# Patient Record
Sex: FEMALE | Race: BLACK OR AFRICAN AMERICAN | NOT HISPANIC OR LATINO | URBAN - METROPOLITAN AREA
[De-identification: names, ages, dates, MRNs, and addresses within clinical notes are randomized per-mention and may not be internally consistent; named-entity substitution may affect disease eponyms.]

---

## 2017-03-01 ENCOUNTER — OUTPATIENT (OUTPATIENT)
Dept: OUTPATIENT SERVICES | Facility: HOSPITAL | Age: 57
LOS: 1 days | End: 2017-03-01

## 2017-03-01 DIAGNOSIS — Z96.60 PRESENCE OF UNSPECIFIED ORTHOPEDIC JOINT IMPLANT: Chronic | ICD-10-CM

## 2017-03-01 DIAGNOSIS — Z98.89 OTHER SPECIFIED POSTPROCEDURAL STATES: Chronic | ICD-10-CM

## 2017-03-01 DIAGNOSIS — T84.093A OTHER MECHANICAL COMPLICATION OF INTERNAL LEFT KNEE PROSTHESIS, INITIAL ENCOUNTER: Chronic | ICD-10-CM

## 2017-03-01 DIAGNOSIS — M21.611 BUNION OF RIGHT FOOT: Chronic | ICD-10-CM

## 2017-03-01 DIAGNOSIS — Z90.710 ACQUIRED ABSENCE OF BOTH CERVIX AND UTERUS: Chronic | ICD-10-CM

## 2017-03-01 DIAGNOSIS — Z98.890 OTHER SPECIFIED POSTPROCEDURAL STATES: Chronic | ICD-10-CM

## 2017-03-01 DIAGNOSIS — Z90.711 ACQUIRED ABSENCE OF UTERUS WITH REMAINING CERVICAL STUMP: Chronic | ICD-10-CM

## 2017-03-01 DIAGNOSIS — Z96.642 PRESENCE OF LEFT ARTIFICIAL HIP JOINT: Chronic | ICD-10-CM

## 2017-03-01 DIAGNOSIS — Z98.51 TUBAL LIGATION STATUS: Chronic | ICD-10-CM

## 2017-03-01 DIAGNOSIS — Z98.84 BARIATRIC SURGERY STATUS: Chronic | ICD-10-CM

## 2017-03-13 DIAGNOSIS — R69 ILLNESS, UNSPECIFIED: ICD-10-CM

## 2018-07-17 ENCOUNTER — RESULT REVIEW (OUTPATIENT)
Age: 58
End: 2018-07-17

## 2021-04-06 ENCOUNTER — RESULT REVIEW (OUTPATIENT)
Age: 61
End: 2021-04-06

## 2021-08-06 ENCOUNTER — APPOINTMENT (OUTPATIENT)
Dept: ORTHOPEDIC SURGERY | Facility: CLINIC | Age: 61
End: 2021-08-06
Payer: MEDICARE

## 2021-08-06 VITALS
OXYGEN SATURATION: 98 % | BODY MASS INDEX: 27.14 KG/M2 | HEART RATE: 70 BPM | WEIGHT: 159 LBS | DIASTOLIC BLOOD PRESSURE: 89 MMHG | SYSTOLIC BLOOD PRESSURE: 151 MMHG | HEIGHT: 64 IN

## 2021-08-06 VITALS — TEMPERATURE: 97.7 F

## 2021-08-06 PROCEDURE — 72100 X-RAY EXAM L-S SPINE 2/3 VWS: CPT

## 2021-08-06 PROCEDURE — 99203 OFFICE O/P NEW LOW 30 MIN: CPT

## 2021-08-11 NOTE — DISCUSSION/SUMMARY
[de-identified] : Given her worsening symptoms a lumbar spine MRI to be obtained.  Follow-up afterwards.

## 2021-08-11 NOTE — PHYSICAL EXAM
[de-identified] : Examination of the lumbar spine reveals no midline tenderness palpation, step-offs, or skin lesions. Decreased range of motion with respect to flexion, extension, lateral bending, and rotation. No tenderness to palpation of the sciatic notch. No tenderness palpation of the bilateral greater trochanters. No pain with passive internal/external rotation of the hips. No instability of bilateral lower extremities.  Negative TYRELL. Negative straight leg raise bilaterally. No bowstring. Negative femoral stretch. 5 out of 5 iliopsoas, hip abductors, hips adductors, quadriceps, hamstrings, gastrocsoleus, tibialis anterior, extensor hallucis longus, peroneals. Grossly intact sensation to light touch bilateral lower extremities. 1+ patellar and Achilles reflexes. Downgoing Babinski. No clonus. Intact proprioception. Palpable pulses. No skin lesion and no edema on the right and left lower extremities. [de-identified] : AP lateral lumbar x-rays with some L4-5 spondylolisthesis

## 2021-08-11 NOTE — HISTORY OF PRESENT ILLNESS
[de-identified] : Ms. ALCIDES RAMÍREZ  is a 61 year old female who presents with a chronic history of low back and bilateral leg pain (right > left).  Her symptoms have gotten worse over time.     Normal bowel and bladder control.   Denies any recent fevers, chills, sweats, weight loss, or infection. She has tried tramadol without relief.  Nsaids will help but she has a bleeding ulcer so she is not supposed to take them. \par \par The patients past medical history, past surgical history, medications, allergies, and social history were reviewed by me today with the patient and documented accordingly.  In addition, the patient's family history, which is noncontributory to their visit, was also reviewed.\par

## 2021-09-03 ENCOUNTER — APPOINTMENT (OUTPATIENT)
Dept: ORTHOPEDIC SURGERY | Facility: CLINIC | Age: 61
End: 2021-09-03
Payer: MEDICARE

## 2021-09-03 PROCEDURE — 99214 OFFICE O/P EST MOD 30 MIN: CPT

## 2021-09-03 NOTE — PHYSICAL EXAM
[Antalgic] : antalgic [Cane] : ambulates with cane [de-identified] : Examination of the lumbar spine reveals no midline tenderness palpation, step-offs, or skin lesions. Decreased range of motion with respect to flexion, extension, lateral bending, and rotation. No tenderness to palpation of the sciatic notch. No tenderness palpation of the bilateral greater trochanters. No pain with passive internal/external rotation of the hips. No instability of bilateral lower extremities.  Negative TYRELL. Negative straight leg raise bilaterally. No bowstring. Negative femoral stretch. 5 out of 5 iliopsoas, hip abductors, hips adductors, quadriceps, hamstrings, gastrocsoleus, tibialis anterior, extensor hallucis longus, peroneals. Grossly intact sensation to light touch bilateral lower extremities. 1+ patellar and Achilles reflexes. Downgoing Babinski. No clonus. Intact proprioception. Palpable pulses. No skin lesion and no edema on the right and left lower extremities. [de-identified] : AP lateral lumbar x-rays with some L4-5 spondylolisthesis\par \par Lumbar spine MRI does reveal moderate to severe stenosis from L2-S1. L4-5 and trace L5-S1 spondylolisthesis

## 2021-09-03 NOTE — HISTORY OF PRESENT ILLNESS
[de-identified] : Ms. ALCIDES RAMÍREZ  is a 61 year old female who presents to the office for a follow-up visit.  She is here to review her MRI results.\par

## 2021-09-03 NOTE — DISCUSSION/SUMMARY
[de-identified] : We discussed further treatment options. She has had extensive nonsurgical treatment with medications, physical therapy, and epidural injections. We discussed the nature purpose of an L2-S1 laminectomy with fusion at L4-5 and possibly at L5-S1. Risks of surgery were discussed including but not limited to bleeding, infection,  hardware related complications, graft migration, pseudoarthrosis, damage to nerves and vessels, continued or worsening pain and weakness, dural injury, CSF leak, need for further procedures, PE/DVT, GI, , pulmonary, and cardiac complications,  anesthetic risks, and death. The patient understands the risks and benefits and alternatives. They would like to proceed with surgery. They will be scheduled accordingly. They will follow up for a preoperative appointment.

## 2021-09-09 ENCOUNTER — OUTPATIENT (OUTPATIENT)
Dept: OUTPATIENT SERVICES | Facility: HOSPITAL | Age: 61
LOS: 1 days | End: 2021-09-09
Payer: MEDICARE

## 2021-09-09 VITALS
WEIGHT: 149.91 LBS | HEART RATE: 76 BPM | TEMPERATURE: 97 F | OXYGEN SATURATION: 98 % | HEIGHT: 65 IN | RESPIRATION RATE: 20 BRPM | SYSTOLIC BLOOD PRESSURE: 130 MMHG | DIASTOLIC BLOOD PRESSURE: 90 MMHG

## 2021-09-09 DIAGNOSIS — Z98.89 OTHER SPECIFIED POSTPROCEDURAL STATES: Chronic | ICD-10-CM

## 2021-09-09 DIAGNOSIS — M48.07 SPINAL STENOSIS, LUMBOSACRAL REGION: ICD-10-CM

## 2021-09-09 DIAGNOSIS — Z98.84 BARIATRIC SURGERY STATUS: Chronic | ICD-10-CM

## 2021-09-09 DIAGNOSIS — Z98.51 TUBAL LIGATION STATUS: Chronic | ICD-10-CM

## 2021-09-09 DIAGNOSIS — Z96.642 PRESENCE OF LEFT ARTIFICIAL HIP JOINT: Chronic | ICD-10-CM

## 2021-09-09 DIAGNOSIS — Z90.710 ACQUIRED ABSENCE OF BOTH CERVIX AND UTERUS: Chronic | ICD-10-CM

## 2021-09-09 DIAGNOSIS — Z98.890 OTHER SPECIFIED POSTPROCEDURAL STATES: Chronic | ICD-10-CM

## 2021-09-09 DIAGNOSIS — Z90.711 ACQUIRED ABSENCE OF UTERUS WITH REMAINING CERVICAL STUMP: Chronic | ICD-10-CM

## 2021-09-09 DIAGNOSIS — Z96.60 PRESENCE OF UNSPECIFIED ORTHOPEDIC JOINT IMPLANT: Chronic | ICD-10-CM

## 2021-09-09 DIAGNOSIS — M21.611 BUNION OF RIGHT FOOT: Chronic | ICD-10-CM

## 2021-09-09 DIAGNOSIS — T84.093A OTHER MECHANICAL COMPLICATION OF INTERNAL LEFT KNEE PROSTHESIS, INITIAL ENCOUNTER: Chronic | ICD-10-CM

## 2021-09-09 DIAGNOSIS — M48.00 SPINAL STENOSIS, SITE UNSPECIFIED: ICD-10-CM

## 2021-09-09 LAB
ANION GAP SERPL CALC-SCNC: 13 MMOL/L — SIGNIFICANT CHANGE UP (ref 7–14)
BLD GP AB SCN SERPL QL: NEGATIVE — SIGNIFICANT CHANGE UP
BUN SERPL-MCNC: 20 MG/DL — SIGNIFICANT CHANGE UP (ref 7–23)
CALCIUM SERPL-MCNC: 10.1 MG/DL — SIGNIFICANT CHANGE UP (ref 8.4–10.5)
CHLORIDE SERPL-SCNC: 103 MMOL/L — SIGNIFICANT CHANGE UP (ref 98–107)
CO2 SERPL-SCNC: 26 MMOL/L — SIGNIFICANT CHANGE UP (ref 22–31)
CREAT SERPL-MCNC: 0.78 MG/DL — SIGNIFICANT CHANGE UP (ref 0.5–1.3)
GLUCOSE SERPL-MCNC: 76 MG/DL — SIGNIFICANT CHANGE UP (ref 70–99)
HCT VFR BLD CALC: 42.4 % — SIGNIFICANT CHANGE UP (ref 34.5–45)
HGB BLD-MCNC: 14 G/DL — SIGNIFICANT CHANGE UP (ref 11.5–15.5)
MCHC RBC-ENTMCNC: 30.2 PG — SIGNIFICANT CHANGE UP (ref 27–34)
MCHC RBC-ENTMCNC: 33 GM/DL — SIGNIFICANT CHANGE UP (ref 32–36)
MCV RBC AUTO: 91.4 FL — SIGNIFICANT CHANGE UP (ref 80–100)
NRBC # BLD: 0 /100 WBCS — SIGNIFICANT CHANGE UP
NRBC # FLD: 0 K/UL — SIGNIFICANT CHANGE UP
PLATELET # BLD AUTO: 237 K/UL — SIGNIFICANT CHANGE UP (ref 150–400)
POTASSIUM SERPL-MCNC: 4.2 MMOL/L — SIGNIFICANT CHANGE UP (ref 3.5–5.3)
POTASSIUM SERPL-SCNC: 4.2 MMOL/L — SIGNIFICANT CHANGE UP (ref 3.5–5.3)
RBC # BLD: 4.64 M/UL — SIGNIFICANT CHANGE UP (ref 3.8–5.2)
RBC # FLD: 14.4 % — SIGNIFICANT CHANGE UP (ref 10.3–14.5)
RH IG SCN BLD-IMP: POSITIVE — SIGNIFICANT CHANGE UP
SODIUM SERPL-SCNC: 142 MMOL/L — SIGNIFICANT CHANGE UP (ref 135–145)
WBC # BLD: 5.9 K/UL — SIGNIFICANT CHANGE UP (ref 3.8–10.5)
WBC # FLD AUTO: 5.9 K/UL — SIGNIFICANT CHANGE UP (ref 3.8–10.5)

## 2021-09-09 PROCEDURE — 93010 ELECTROCARDIOGRAM REPORT: CPT

## 2021-09-09 RX ORDER — AMLODIPINE BESYLATE AND BENAZEPRIL HYDROCHLORIDE 10; 20 MG/1; MG/1
1 CAPSULE ORAL
Qty: 0 | Refills: 0 | DISCHARGE

## 2021-09-09 RX ORDER — SODIUM CHLORIDE 9 MG/ML
1000 INJECTION, SOLUTION INTRAVENOUS
Refills: 0 | Status: DISCONTINUED | OUTPATIENT
Start: 2021-09-21 | End: 2021-09-22

## 2021-09-09 NOTE — H&P PST ADULT - MUSCULOSKELETAL COMMENTS
spinal stenosis lumbosacral region/ radiculopathy lumbar region/ spondylolisthesis lumbar region DX: spinal stenosis lumbosacral region/ radiculopathy lumbar region/ spondylolisthesis lumbar region

## 2021-09-09 NOTE — H&P PST ADULT - NSANTHOSAYNRD_GEN_A_CORE
Denies sleep studies done in the past/No. JAMMIE screening performed.  STOP BANG Legend: 0-2 = LOW Risk; 3-4 = INTERMEDIATE Risk; 5-8 = HIGH Risk

## 2021-09-09 NOTE — H&P PST ADULT - NSICDXPASTMEDICALHX_GEN_ALL_CORE_FT
PAST MEDICAL HISTORY:  Anemia     Anxiety disorder     Arthritis     CVA (cerebral vascular accident) 2008 with left sided weakness -resolved    Depression     Depression     GERD (gastroesophageal reflux disease)     Hernia     HTN (hypertension)     Hypertension     Insomnia     Iron deficiency anemia     Osteoarthritis     Panic attacks     Seasonal allergies     Stroke 2 instances in 2008. Left sided weakness residual, left sided facial droopiness    TIA (transient ischemic attack) 4 instances 2012     PAST MEDICAL HISTORY:  Anemia     Anxiety disorder     Arthritis     Bipolar disorder     CVA (cerebral vascular accident) 2008 with left sided weakness -resolved    Depression     Depression     GERD (gastroesophageal reflux disease)     Hernia     HTN (hypertension)     Hypertension     Insomnia     Iron deficiency anemia     Osteoarthritis     Panic attacks     Seasonal allergies     Stroke 2 instances in 2008. Left sided weakness residual, left sided facial droopiness    TIA (transient ischemic attack) 4 instances 2012

## 2021-09-09 NOTE — H&P PST ADULT - MUSCULOSKELETAL
no joint swelling/no joint erythema/no joint warmth/no calf tenderness/decreased ROM/diminished strength details… detailed exam

## 2021-09-09 NOTE — H&P PST ADULT - HISTORY OF PRESENT ILLNESS
60 y/o F presents to PST w/ a preop dx of spinal stenosis lumbosacral region/ radiculopathy lumbar region/ spondylolisthesis lumbar region and to be evaluated for a scheduled L2-S1 laminectomy and L4-5 fusion. pt states back pain for about 6 years, worsening. undergo PT, po meds, cortisone shoots w/o relief. Pt re evaluated and recommended surgical intervention at this time w/ Dr Strauss.

## 2021-09-09 NOTE — H&P PST ADULT - NSICDXPASTSURGICALHX_GEN_ALL_CORE_FT
PAST SURGICAL HISTORY:  Bunion of great toe of right foot removal of bunion    Failed total left knee replacement 2015    H/O gastric bypass     History of abdominoplasty     History of hysterectomy partial    History of left hip replacement     S/P arthroscopic surgery of right knee     S/P arthroscopy of left knee     S/P endoscopy 3-24-14 removed mesh    S/P gastric bypass 2004    S/P hip replacement left 2011    S/P partial hysterectomy 2005    S/P tubal ligation 1995

## 2021-09-09 NOTE — H&P PST ADULT - PROBLEM SELECTOR PLAN 1
Preop instructions provided including npo status, Hibiclens wash for infection control and GI prophylaxis. Pt to c/w current meds, aware to stop any NSAIDS, OTC herbals or MVI on 9/14/21. Verbalized understanding. BW done, pending results. DVT prophylaxis as per primary team. MC pending, form provided. Aware to f/u on covid testing prior to sx as per pst protocol and will arrange w/ surgeons office. JAMMIE precautions/allergy notified to OR booking via fax. Preop instructions provided including npo status, Hibiclens wash for infection control and GI prophylaxis. Pt to c/w current meds, take amlodipine in am dos as ordered. Pt aware to stop any NSAIDS, OTC herbals or MVI on 9/14/21. Verbalized understanding. BW done, pending results. DVT prophylaxis as per primary team. MC pending, form provided. Aware to f/u on covid testing prior to sx as per pst protocol and will arrange w/ surgeons office. JAMMIE precautions/allergy notified to OR booking via fax.

## 2021-09-09 NOTE — H&P PST ADULT - ATTENDING COMMENTS
the patient is having intractable back and leg pain, 4/5 BLE with exception of 2/5 L IP- the nature of the planned surgery, other options available to the patient, the risks and possible complications of this surgery and the other options, including but not limited to death, paralysis, nerve damage, infection, bleeding, failure of the surgery to relieve  symptoms, failure of fusion, dislodgement of interbody graft, hardware failure/breakage/loosening, use of DBM and inherent risks pulmonary and/or cardiac complications, DVT, PE, UTI, spinal fluid leakage, possible need for further surgery in the future, adjacent segment deterioration, etc were discussed with the patient at length and the patient understands and wishes to proceed

## 2021-09-09 NOTE — H&P PST ADULT - ASSESSMENT
DX: spinal stenosis lumbosacral region/ radiculopathy lumbar region/ spondylolisthesis lumbar region and evaluated for a scheduled L2-S1 laminectomy and L4-5 fusion w/ Dr Strauss on 9/21/21.

## 2021-09-09 NOTE — H&P PST ADULT - NEGATIVE GENERAL GENITOURINARY SYMPTOMS
no hematuria/no renal colic/no flank pain L/no flank pain R/no urine discoloration/no bladder infections/no incontinence/no dysuria/no urinary hesitancy/no nocturia

## 2021-09-09 NOTE — H&P PST ADULT - NS PRO FEM REPRO HEALTH SCREEN
Repair of closed fracture of tibia  2002  S/P orthopedic surgery, follow-up exam  arthoscopy L knee - x 2 - 2012, 2013
mammogram

## 2021-09-09 NOTE — H&P PST ADULT - NEUROLOGICAL SYMPTOMS
uses a cane for support/ unsteady Gait wnl, no devices stated/weakness/paresthesias/difficulty walking

## 2021-09-09 NOTE — H&P PST ADULT - MS GEN HX ROS MEA POS PC
knees/ hips/ back pain w/ oa and current condition/arthritis/joint pain/stiffness/back pain/leg pain L/leg pain R

## 2021-09-09 NOTE — H&P PST ADULT - NSICDXFAMILYHX_GEN_ALL_CORE_FT
FAMILY HISTORY:  Father  Still living? Unknown  Family history of cancer, Age at diagnosis: Age Unknown    Mother  Still living? Unknown  Family history of diabetes mellitus, Age at diagnosis: Age Unknown  Family history of hypertension, Age at diagnosis: Age Unknown  Family history of stroke, Age at diagnosis: Age Unknown    Sibling  Still living? Unknown  Family history of diabetes mellitus, Age at diagnosis: Age Unknown

## 2021-09-09 NOTE — H&P PST ADULT - NEGATIVE GASTROINTESTINAL SYMPTOMS
no nausea/no vomiting/no constipation/no change in bowel habits/no flatulence/no abdominal pain/no melena/no hematochezia/no steatorrhea/no jaundice/no hiccoughs

## 2021-09-09 NOTE — H&P PST ADULT - HEALTH CARE MAINTENANCE
Colonoscopy: wnl, last test done 4 year ago   flu vaccine: 2020  Covid Vaccine: x2 Pfyzer   Denies current covid S&S or in the past, test neg when done. Pt denies history of travel outside the country and outside [place] and denies COVID19 positive contacts within the last 14 days.

## 2021-09-09 NOTE — H&P PST ADULT - NEGATIVE NEUROLOGICAL SYMPTOMS
no transient paralysis/no generalized seizures/no focal seizures/no syncope/no tremors/no vertigo/no loss of sensation/no headache/no loss of consciousness/no hemiparesis/no confusion/no facial palsy

## 2021-09-09 NOTE — H&P PST ADULT - BACK COMMENTS
DX: spinal stenosis lumbosacral region/ radiculopathy lumbar region/ spondylolisthesis lumbar region

## 2021-09-10 LAB
MRSA PCR RESULT.: SIGNIFICANT CHANGE UP
S AUREUS DNA NOSE QL NAA+PROBE: SIGNIFICANT CHANGE UP

## 2021-09-12 PROBLEM — F31.9 BIPOLAR DISORDER, UNSPECIFIED: Chronic | Status: ACTIVE | Noted: 2021-09-09

## 2021-09-17 DIAGNOSIS — Z01.818 ENCOUNTER FOR OTHER PREPROCEDURAL EXAMINATION: ICD-10-CM

## 2021-09-18 ENCOUNTER — APPOINTMENT (OUTPATIENT)
Dept: DISASTER EMERGENCY | Facility: CLINIC | Age: 61
End: 2021-09-18

## 2021-09-19 LAB — SARS-COV-2 N GENE NPH QL NAA+PROBE: NOT DETECTED

## 2021-09-20 ENCOUNTER — TRANSCRIPTION ENCOUNTER (OUTPATIENT)
Age: 61
End: 2021-09-20

## 2021-09-21 ENCOUNTER — RESULT REVIEW (OUTPATIENT)
Age: 61
End: 2021-09-21

## 2021-09-21 ENCOUNTER — INPATIENT (INPATIENT)
Facility: HOSPITAL | Age: 61
LOS: 8 days | Discharge: NURSING, REHAB OR LONG-TERM CARE | End: 2021-09-30
Attending: STUDENT IN AN ORGANIZED HEALTH CARE EDUCATION/TRAINING PROGRAM | Admitting: STUDENT IN AN ORGANIZED HEALTH CARE EDUCATION/TRAINING PROGRAM
Payer: MEDICARE

## 2021-09-21 ENCOUNTER — APPOINTMENT (OUTPATIENT)
Dept: ORTHOPEDIC SURGERY | Facility: HOSPITAL | Age: 61
End: 2021-09-21
Payer: MEDICARE

## 2021-09-21 VITALS
WEIGHT: 149.91 LBS | TEMPERATURE: 98 F | DIASTOLIC BLOOD PRESSURE: 76 MMHG | HEART RATE: 68 BPM | SYSTOLIC BLOOD PRESSURE: 123 MMHG | HEIGHT: 65 IN | RESPIRATION RATE: 14 BRPM | OXYGEN SATURATION: 96 %

## 2021-09-21 DIAGNOSIS — Z96.642 PRESENCE OF LEFT ARTIFICIAL HIP JOINT: Chronic | ICD-10-CM

## 2021-09-21 DIAGNOSIS — Z98.51 TUBAL LIGATION STATUS: Chronic | ICD-10-CM

## 2021-09-21 DIAGNOSIS — M48.07 SPINAL STENOSIS, LUMBOSACRAL REGION: ICD-10-CM

## 2021-09-21 DIAGNOSIS — Z96.60 PRESENCE OF UNSPECIFIED ORTHOPEDIC JOINT IMPLANT: Chronic | ICD-10-CM

## 2021-09-21 DIAGNOSIS — Z98.89 OTHER SPECIFIED POSTPROCEDURAL STATES: Chronic | ICD-10-CM

## 2021-09-21 DIAGNOSIS — T84.093A OTHER MECHANICAL COMPLICATION OF INTERNAL LEFT KNEE PROSTHESIS, INITIAL ENCOUNTER: Chronic | ICD-10-CM

## 2021-09-21 DIAGNOSIS — Z98.84 BARIATRIC SURGERY STATUS: Chronic | ICD-10-CM

## 2021-09-21 DIAGNOSIS — Z90.710 ACQUIRED ABSENCE OF BOTH CERVIX AND UTERUS: Chronic | ICD-10-CM

## 2021-09-21 DIAGNOSIS — Z98.890 OTHER SPECIFIED POSTPROCEDURAL STATES: Chronic | ICD-10-CM

## 2021-09-21 DIAGNOSIS — M21.611 BUNION OF RIGHT FOOT: Chronic | ICD-10-CM

## 2021-09-21 DIAGNOSIS — Z90.711 ACQUIRED ABSENCE OF UTERUS WITH REMAINING CERVICAL STUMP: Chronic | ICD-10-CM

## 2021-09-21 LAB
ANION GAP SERPL CALC-SCNC: 7 MMOL/L — SIGNIFICANT CHANGE UP (ref 7–14)
BASOPHILS # BLD AUTO: 0.03 K/UL — SIGNIFICANT CHANGE UP (ref 0–0.2)
BASOPHILS NFR BLD AUTO: 0.4 % — SIGNIFICANT CHANGE UP (ref 0–2)
BUN SERPL-MCNC: 22 MG/DL — SIGNIFICANT CHANGE UP (ref 7–23)
CALCIUM SERPL-MCNC: 8.9 MG/DL — SIGNIFICANT CHANGE UP (ref 8.4–10.5)
CHLORIDE SERPL-SCNC: 106 MMOL/L — SIGNIFICANT CHANGE UP (ref 98–107)
CO2 SERPL-SCNC: 26 MMOL/L — SIGNIFICANT CHANGE UP (ref 22–31)
CREAT SERPL-MCNC: 0.92 MG/DL — SIGNIFICANT CHANGE UP (ref 0.5–1.3)
EOSINOPHIL # BLD AUTO: 0.05 K/UL — SIGNIFICANT CHANGE UP (ref 0–0.5)
EOSINOPHIL NFR BLD AUTO: 0.7 % — SIGNIFICANT CHANGE UP (ref 0–6)
GLUCOSE SERPL-MCNC: 108 MG/DL — HIGH (ref 70–99)
HCT VFR BLD CALC: 31.5 % — LOW (ref 34.5–45)
HGB BLD-MCNC: 10.2 G/DL — LOW (ref 11.5–15.5)
IANC: 5.13 K/UL — SIGNIFICANT CHANGE UP (ref 1.5–8.5)
IMM GRANULOCYTES NFR BLD AUTO: 0.7 % — SIGNIFICANT CHANGE UP (ref 0–1.5)
LYMPHOCYTES # BLD AUTO: 1.67 K/UL — SIGNIFICANT CHANGE UP (ref 1–3.3)
LYMPHOCYTES # BLD AUTO: 22.6 % — SIGNIFICANT CHANGE UP (ref 13–44)
MCHC RBC-ENTMCNC: 29.4 PG — SIGNIFICANT CHANGE UP (ref 27–34)
MCHC RBC-ENTMCNC: 32.4 GM/DL — SIGNIFICANT CHANGE UP (ref 32–36)
MCV RBC AUTO: 90.8 FL — SIGNIFICANT CHANGE UP (ref 80–100)
MONOCYTES # BLD AUTO: 0.45 K/UL — SIGNIFICANT CHANGE UP (ref 0–0.9)
MONOCYTES NFR BLD AUTO: 6.1 % — SIGNIFICANT CHANGE UP (ref 2–14)
NEUTROPHILS # BLD AUTO: 5.13 K/UL — SIGNIFICANT CHANGE UP (ref 1.8–7.4)
NEUTROPHILS NFR BLD AUTO: 69.5 % — SIGNIFICANT CHANGE UP (ref 43–77)
NRBC # BLD: 0 /100 WBCS — SIGNIFICANT CHANGE UP
NRBC # FLD: 0 K/UL — SIGNIFICANT CHANGE UP
PLATELET # BLD AUTO: 195 K/UL — SIGNIFICANT CHANGE UP (ref 150–400)
POTASSIUM SERPL-MCNC: 3.8 MMOL/L — SIGNIFICANT CHANGE UP (ref 3.5–5.3)
POTASSIUM SERPL-SCNC: 3.8 MMOL/L — SIGNIFICANT CHANGE UP (ref 3.5–5.3)
RBC # BLD: 3.47 M/UL — LOW (ref 3.8–5.2)
RBC # FLD: 14.5 % — SIGNIFICANT CHANGE UP (ref 10.3–14.5)
RH IG SCN BLD-IMP: POSITIVE — SIGNIFICANT CHANGE UP
SODIUM SERPL-SCNC: 139 MMOL/L — SIGNIFICANT CHANGE UP (ref 135–145)
WBC # BLD: 7.38 K/UL — SIGNIFICANT CHANGE UP (ref 3.8–10.5)
WBC # FLD AUTO: 7.38 K/UL — SIGNIFICANT CHANGE UP (ref 3.8–10.5)

## 2021-09-21 PROCEDURE — 22214 INCIS 1 VERTEBRAL SEG LUMBAR: CPT | Mod: 82

## 2021-09-21 PROCEDURE — 63048 LAM FACETEC &FORAMOT EA ADDL: CPT | Mod: 82,59

## 2021-09-21 PROCEDURE — 63048 LAM FACETEC &FORAMOT EA ADDL: CPT | Mod: 59

## 2021-09-21 PROCEDURE — 88304 TISSUE EXAM BY PATHOLOGIST: CPT | Mod: 26

## 2021-09-21 PROCEDURE — 63047 LAM FACETEC & FORAMOT LUMBAR: CPT | Mod: 59

## 2021-09-21 PROCEDURE — 22840 INSERT SPINE FIXATION DEVICE: CPT

## 2021-09-21 PROCEDURE — 22214 INCIS 1 VERTEBRAL SEG LUMBAR: CPT

## 2021-09-21 PROCEDURE — 22840 INSERT SPINE FIXATION DEVICE: CPT | Mod: 82

## 2021-09-21 PROCEDURE — 63267 EXCISE INTRSPINL LESION LMBR: CPT | Mod: 59

## 2021-09-21 PROCEDURE — 63047 LAM FACETEC & FORAMOT LUMBAR: CPT | Mod: 82,59

## 2021-09-21 PROCEDURE — 63267 EXCISE INTRSPINL LESION LMBR: CPT | Mod: 82,59

## 2021-09-21 PROCEDURE — 22853 INSJ BIOMECHANICAL DEVICE: CPT

## 2021-09-21 PROCEDURE — 22853 INSJ BIOMECHANICAL DEVICE: CPT | Mod: 82

## 2021-09-21 PROCEDURE — 88311 DECALCIFY TISSUE: CPT | Mod: 26

## 2021-09-21 PROCEDURE — 22633 ARTHRD CMBN 1NTRSPC LUMBAR: CPT | Mod: 82

## 2021-09-21 PROCEDURE — 22633 ARTHRD CMBN 1NTRSPC LUMBAR: CPT

## 2021-09-21 RX ORDER — CYCLOBENZAPRINE HYDROCHLORIDE 10 MG/1
10 TABLET, FILM COATED ORAL THREE TIMES A DAY
Refills: 0 | Status: DISCONTINUED | OUTPATIENT
Start: 2021-09-21 | End: 2021-09-22

## 2021-09-21 RX ORDER — LAMOTRIGINE 25 MG/1
200 TABLET, ORALLY DISINTEGRATING ORAL DAILY
Refills: 0 | Status: DISCONTINUED | OUTPATIENT
Start: 2021-09-21 | End: 2021-09-21

## 2021-09-21 RX ORDER — SODIUM CHLORIDE 9 MG/ML
1000 INJECTION, SOLUTION INTRAVENOUS
Refills: 0 | Status: DISCONTINUED | OUTPATIENT
Start: 2021-09-21 | End: 2021-09-21

## 2021-09-21 RX ORDER — MAGNESIUM HYDROXIDE 400 MG/1
30 TABLET, CHEWABLE ORAL EVERY 12 HOURS
Refills: 0 | Status: DISCONTINUED | OUTPATIENT
Start: 2021-09-21 | End: 2021-09-30

## 2021-09-21 RX ORDER — ONDANSETRON 8 MG/1
4 TABLET, FILM COATED ORAL EVERY 6 HOURS
Refills: 0 | Status: DISCONTINUED | OUTPATIENT
Start: 2021-09-21 | End: 2021-09-30

## 2021-09-21 RX ORDER — LAMOTRIGINE 25 MG/1
100 TABLET, ORALLY DISINTEGRATING ORAL
Refills: 0 | Status: DISCONTINUED | OUTPATIENT
Start: 2021-09-21 | End: 2021-09-24

## 2021-09-21 RX ORDER — POLYETHYLENE GLYCOL 3350 17 G/17G
17 POWDER, FOR SOLUTION ORAL DAILY
Refills: 0 | Status: DISCONTINUED | OUTPATIENT
Start: 2021-09-21 | End: 2021-09-30

## 2021-09-21 RX ORDER — OXYCODONE HYDROCHLORIDE 5 MG/1
10 TABLET ORAL EVERY 4 HOURS
Refills: 0 | Status: DISCONTINUED | OUTPATIENT
Start: 2021-09-21 | End: 2021-09-22

## 2021-09-21 RX ORDER — BENZOCAINE AND MENTHOL 5; 1 G/100ML; G/100ML
1 LIQUID ORAL
Refills: 0 | Status: DISCONTINUED | OUTPATIENT
Start: 2021-09-21 | End: 2021-09-30

## 2021-09-21 RX ORDER — HYDROMORPHONE HYDROCHLORIDE 2 MG/ML
30 INJECTION INTRAMUSCULAR; INTRAVENOUS; SUBCUTANEOUS
Refills: 0 | Status: DISCONTINUED | OUTPATIENT
Start: 2021-09-21 | End: 2021-09-22

## 2021-09-21 RX ORDER — SODIUM CHLORIDE 9 MG/ML
1000 INJECTION INTRAMUSCULAR; INTRAVENOUS; SUBCUTANEOUS
Refills: 0 | Status: DISCONTINUED | OUTPATIENT
Start: 2021-09-21 | End: 2021-09-23

## 2021-09-21 RX ORDER — OXYCODONE HYDROCHLORIDE 5 MG/1
5 TABLET ORAL EVERY 4 HOURS
Refills: 0 | Status: DISCONTINUED | OUTPATIENT
Start: 2021-09-21 | End: 2021-09-22

## 2021-09-21 RX ORDER — HYDROMORPHONE HYDROCHLORIDE 2 MG/ML
0.5 INJECTION INTRAMUSCULAR; INTRAVENOUS; SUBCUTANEOUS
Refills: 0 | Status: DISCONTINUED | OUTPATIENT
Start: 2021-09-21 | End: 2021-09-21

## 2021-09-21 RX ORDER — TRAMADOL HYDROCHLORIDE 50 MG/1
50 TABLET ORAL EVERY 8 HOURS
Refills: 0 | Status: DISCONTINUED | OUTPATIENT
Start: 2021-09-21 | End: 2021-09-22

## 2021-09-21 RX ORDER — ALBUMIN HUMAN 25 %
250 VIAL (ML) INTRAVENOUS ONCE
Refills: 0 | Status: COMPLETED | OUTPATIENT
Start: 2021-09-21 | End: 2021-09-21

## 2021-09-21 RX ORDER — CITALOPRAM 10 MG/1
40 TABLET, FILM COATED ORAL DAILY
Refills: 0 | Status: DISCONTINUED | OUTPATIENT
Start: 2021-09-21 | End: 2021-09-24

## 2021-09-21 RX ORDER — CEFAZOLIN SODIUM 1 G
2000 VIAL (EA) INJECTION EVERY 8 HOURS
Refills: 0 | Status: COMPLETED | OUTPATIENT
Start: 2021-09-21 | End: 2021-09-22

## 2021-09-21 RX ORDER — SENNA PLUS 8.6 MG/1
2 TABLET ORAL AT BEDTIME
Refills: 0 | Status: DISCONTINUED | OUTPATIENT
Start: 2021-09-21 | End: 2021-09-30

## 2021-09-21 RX ORDER — HYDROCHLOROTHIAZIDE 25 MG
25 TABLET ORAL DAILY
Refills: 0 | Status: DISCONTINUED | OUTPATIENT
Start: 2021-09-21 | End: 2021-09-23

## 2021-09-21 RX ORDER — ONDANSETRON 8 MG/1
4 TABLET, FILM COATED ORAL ONCE
Refills: 0 | Status: DISCONTINUED | OUTPATIENT
Start: 2021-09-21 | End: 2021-09-21

## 2021-09-21 RX ORDER — ACETAMINOPHEN 500 MG
975 TABLET ORAL EVERY 8 HOURS
Refills: 0 | Status: DISCONTINUED | OUTPATIENT
Start: 2021-09-21 | End: 2021-09-30

## 2021-09-21 RX ORDER — PANTOPRAZOLE SODIUM 20 MG/1
40 TABLET, DELAYED RELEASE ORAL
Refills: 0 | Status: DISCONTINUED | OUTPATIENT
Start: 2021-09-21 | End: 2021-09-30

## 2021-09-21 RX ORDER — NALOXONE HYDROCHLORIDE 4 MG/.1ML
0.1 SPRAY NASAL
Refills: 0 | Status: DISCONTINUED | OUTPATIENT
Start: 2021-09-21 | End: 2021-09-30

## 2021-09-21 RX ADMIN — Medication 125 MILLILITER(S): at 15:30

## 2021-09-21 RX ADMIN — Medication 5 MILLIGRAM(S): at 21:38

## 2021-09-21 RX ADMIN — SODIUM CHLORIDE 125 MILLILITER(S): 9 INJECTION INTRAMUSCULAR; INTRAVENOUS; SUBCUTANEOUS at 15:35

## 2021-09-21 RX ADMIN — Medication 975 MILLIGRAM(S): at 20:08

## 2021-09-21 RX ADMIN — HYDROMORPHONE HYDROCHLORIDE 30 MILLILITER(S): 2 INJECTION INTRAMUSCULAR; INTRAVENOUS; SUBCUTANEOUS at 17:22

## 2021-09-21 RX ADMIN — Medication 100 MILLIGRAM(S): at 17:26

## 2021-09-21 RX ADMIN — HYDROMORPHONE HYDROCHLORIDE 30 MILLILITER(S): 2 INJECTION INTRAMUSCULAR; INTRAVENOUS; SUBCUTANEOUS at 20:48

## 2021-09-21 RX ADMIN — SODIUM CHLORIDE 125 MILLILITER(S): 9 INJECTION INTRAMUSCULAR; INTRAVENOUS; SUBCUTANEOUS at 17:22

## 2021-09-21 RX ADMIN — LAMOTRIGINE 100 MILLIGRAM(S): 25 TABLET, ORALLY DISINTEGRATING ORAL at 19:54

## 2021-09-21 RX ADMIN — CYCLOBENZAPRINE HYDROCHLORIDE 10 MILLIGRAM(S): 10 TABLET, FILM COATED ORAL at 21:28

## 2021-09-21 RX ADMIN — SODIUM CHLORIDE 30 MILLILITER(S): 9 INJECTION, SOLUTION INTRAVENOUS at 23:05

## 2021-09-21 RX ADMIN — HYDROMORPHONE HYDROCHLORIDE 30 MILLILITER(S): 2 INJECTION INTRAMUSCULAR; INTRAVENOUS; SUBCUTANEOUS at 14:24

## 2021-09-21 NOTE — ASU PREOP CHECKLIST - 2.
MRSA/MSSA swab negative, pt unsure if she was prescribed did not complete any course of nasal mupriocin

## 2021-09-21 NOTE — ASU PREOP CHECKLIST - COMMENTS
sips of water with amlodipine-benzepri and famotidine sips of water with amlodipine-benzepril and famotidine

## 2021-09-22 DIAGNOSIS — Z86.73 PERSONAL HISTORY OF TRANSIENT ISCHEMIC ATTACK (TIA), AND CEREBRAL INFARCTION WITHOUT RESIDUAL DEFICITS: ICD-10-CM

## 2021-09-22 DIAGNOSIS — I10 ESSENTIAL (PRIMARY) HYPERTENSION: ICD-10-CM

## 2021-09-22 DIAGNOSIS — D62 ACUTE POSTHEMORRHAGIC ANEMIA: ICD-10-CM

## 2021-09-22 DIAGNOSIS — F32.9 MAJOR DEPRESSIVE DISORDER, SINGLE EPISODE, UNSPECIFIED: ICD-10-CM

## 2021-09-22 LAB
ANION GAP SERPL CALC-SCNC: 10 MMOL/L — SIGNIFICANT CHANGE UP (ref 7–14)
BASOPHILS # BLD AUTO: 0.04 K/UL — SIGNIFICANT CHANGE UP (ref 0–0.2)
BASOPHILS NFR BLD AUTO: 0.5 % — SIGNIFICANT CHANGE UP (ref 0–2)
BUN SERPL-MCNC: 18 MG/DL — SIGNIFICANT CHANGE UP (ref 7–23)
CALCIUM SERPL-MCNC: 9.4 MG/DL — SIGNIFICANT CHANGE UP (ref 8.4–10.5)
CHLORIDE SERPL-SCNC: 103 MMOL/L — SIGNIFICANT CHANGE UP (ref 98–107)
CO2 SERPL-SCNC: 24 MMOL/L — SIGNIFICANT CHANGE UP (ref 22–31)
CREAT SERPL-MCNC: 0.84 MG/DL — SIGNIFICANT CHANGE UP (ref 0.5–1.3)
EOSINOPHIL # BLD AUTO: 0.02 K/UL — SIGNIFICANT CHANGE UP (ref 0–0.5)
EOSINOPHIL NFR BLD AUTO: 0.2 % — SIGNIFICANT CHANGE UP (ref 0–6)
GLUCOSE BLDC GLUCOMTR-MCNC: 96 MG/DL — SIGNIFICANT CHANGE UP (ref 70–99)
GLUCOSE SERPL-MCNC: 103 MG/DL — HIGH (ref 70–99)
HCT VFR BLD CALC: 27.6 % — LOW (ref 34.5–45)
HCT VFR BLD CALC: 30.7 % — LOW (ref 34.5–45)
HGB BLD-MCNC: 10 G/DL — LOW (ref 11.5–15.5)
HGB BLD-MCNC: 9.1 G/DL — LOW (ref 11.5–15.5)
IANC: 6.54 K/UL — SIGNIFICANT CHANGE UP (ref 1.5–8.5)
IMM GRANULOCYTES NFR BLD AUTO: 0.4 % — SIGNIFICANT CHANGE UP (ref 0–1.5)
LYMPHOCYTES # BLD AUTO: 0.9 K/UL — LOW (ref 1–3.3)
LYMPHOCYTES # BLD AUTO: 11 % — LOW (ref 13–44)
MCHC RBC-ENTMCNC: 29.8 PG — SIGNIFICANT CHANGE UP (ref 27–34)
MCHC RBC-ENTMCNC: 29.8 PG — SIGNIFICANT CHANGE UP (ref 27–34)
MCHC RBC-ENTMCNC: 32.6 GM/DL — SIGNIFICANT CHANGE UP (ref 32–36)
MCHC RBC-ENTMCNC: 33 GM/DL — SIGNIFICANT CHANGE UP (ref 32–36)
MCV RBC AUTO: 90.5 FL — SIGNIFICANT CHANGE UP (ref 80–100)
MCV RBC AUTO: 91.4 FL — SIGNIFICANT CHANGE UP (ref 80–100)
MONOCYTES # BLD AUTO: 0.68 K/UL — SIGNIFICANT CHANGE UP (ref 0–0.9)
MONOCYTES NFR BLD AUTO: 8.3 % — SIGNIFICANT CHANGE UP (ref 2–14)
NEUTROPHILS # BLD AUTO: 6.54 K/UL — SIGNIFICANT CHANGE UP (ref 1.8–7.4)
NEUTROPHILS NFR BLD AUTO: 79.6 % — HIGH (ref 43–77)
NRBC # BLD: 0 /100 WBCS — SIGNIFICANT CHANGE UP
NRBC # BLD: 0 /100 WBCS — SIGNIFICANT CHANGE UP
NRBC # FLD: 0 K/UL — SIGNIFICANT CHANGE UP
NRBC # FLD: 0 K/UL — SIGNIFICANT CHANGE UP
PLATELET # BLD AUTO: 171 K/UL — SIGNIFICANT CHANGE UP (ref 150–400)
PLATELET # BLD AUTO: 197 K/UL — SIGNIFICANT CHANGE UP (ref 150–400)
POTASSIUM SERPL-MCNC: 4.7 MMOL/L — SIGNIFICANT CHANGE UP (ref 3.5–5.3)
POTASSIUM SERPL-SCNC: 4.7 MMOL/L — SIGNIFICANT CHANGE UP (ref 3.5–5.3)
RBC # BLD: 3.05 M/UL — LOW (ref 3.8–5.2)
RBC # BLD: 3.36 M/UL — LOW (ref 3.8–5.2)
RBC # FLD: 14.4 % — SIGNIFICANT CHANGE UP (ref 10.3–14.5)
RBC # FLD: 14.6 % — HIGH (ref 10.3–14.5)
SODIUM SERPL-SCNC: 137 MMOL/L — SIGNIFICANT CHANGE UP (ref 135–145)
WBC # BLD: 8.21 K/UL — SIGNIFICANT CHANGE UP (ref 3.8–10.5)
WBC # BLD: 9.95 K/UL — SIGNIFICANT CHANGE UP (ref 3.8–10.5)
WBC # FLD AUTO: 8.21 K/UL — SIGNIFICANT CHANGE UP (ref 3.8–10.5)
WBC # FLD AUTO: 9.95 K/UL — SIGNIFICANT CHANGE UP (ref 3.8–10.5)

## 2021-09-22 PROCEDURE — 93010 ELECTROCARDIOGRAM REPORT: CPT

## 2021-09-22 PROCEDURE — 99223 1ST HOSP IP/OBS HIGH 75: CPT

## 2021-09-22 RX ORDER — AMLODIPINE BESYLATE 2.5 MG/1
10 TABLET ORAL DAILY
Refills: 0 | Status: DISCONTINUED | OUTPATIENT
Start: 2021-09-22 | End: 2021-09-30

## 2021-09-22 RX ORDER — LISINOPRIL 2.5 MG/1
40 TABLET ORAL DAILY
Refills: 0 | Status: DISCONTINUED | OUTPATIENT
Start: 2021-09-22 | End: 2021-09-23

## 2021-09-22 RX ORDER — SODIUM CHLORIDE 9 MG/ML
1000 INJECTION, SOLUTION INTRAVENOUS ONCE
Refills: 0 | Status: COMPLETED | OUTPATIENT
Start: 2021-09-22 | End: 2021-09-22

## 2021-09-22 RX ORDER — FLUTICASONE PROPIONATE 50 MCG
1 SPRAY, SUSPENSION NASAL
Refills: 0 | Status: DISCONTINUED | OUTPATIENT
Start: 2021-09-22 | End: 2021-09-30

## 2021-09-22 RX ORDER — HYDROMORPHONE HYDROCHLORIDE 2 MG/ML
0.5 INJECTION INTRAMUSCULAR; INTRAVENOUS; SUBCUTANEOUS
Refills: 0 | Status: DISCONTINUED | OUTPATIENT
Start: 2021-09-22 | End: 2021-09-23

## 2021-09-22 RX ORDER — CYCLOBENZAPRINE HYDROCHLORIDE 10 MG/1
10 TABLET, FILM COATED ORAL THREE TIMES A DAY
Refills: 0 | Status: DISCONTINUED | OUTPATIENT
Start: 2021-09-22 | End: 2021-09-23

## 2021-09-22 RX ORDER — OXYCODONE HYDROCHLORIDE 5 MG/1
10 TABLET ORAL
Refills: 0 | Status: DISCONTINUED | OUTPATIENT
Start: 2021-09-22 | End: 2021-09-23

## 2021-09-22 RX ORDER — ATORVASTATIN CALCIUM 80 MG/1
40 TABLET, FILM COATED ORAL AT BEDTIME
Refills: 0 | Status: DISCONTINUED | OUTPATIENT
Start: 2021-09-22 | End: 2021-09-30

## 2021-09-22 RX ORDER — OXYCODONE HYDROCHLORIDE 5 MG/1
5 TABLET ORAL
Refills: 0 | Status: DISCONTINUED | OUTPATIENT
Start: 2021-09-22 | End: 2021-09-23

## 2021-09-22 RX ADMIN — POLYETHYLENE GLYCOL 3350 17 GRAM(S): 17 POWDER, FOR SOLUTION ORAL at 12:34

## 2021-09-22 RX ADMIN — LAMOTRIGINE 100 MILLIGRAM(S): 25 TABLET, ORALLY DISINTEGRATING ORAL at 17:29

## 2021-09-22 RX ADMIN — ATORVASTATIN CALCIUM 40 MILLIGRAM(S): 80 TABLET, FILM COATED ORAL at 21:09

## 2021-09-22 RX ADMIN — OXYCODONE HYDROCHLORIDE 10 MILLIGRAM(S): 5 TABLET ORAL at 13:26

## 2021-09-22 RX ADMIN — SENNA PLUS 2 TABLET(S): 8.6 TABLET ORAL at 21:08

## 2021-09-22 RX ADMIN — LAMOTRIGINE 100 MILLIGRAM(S): 25 TABLET, ORALLY DISINTEGRATING ORAL at 06:37

## 2021-09-22 RX ADMIN — TRAMADOL HYDROCHLORIDE 50 MILLIGRAM(S): 50 TABLET ORAL at 05:40

## 2021-09-22 RX ADMIN — LISINOPRIL 40 MILLIGRAM(S): 2.5 TABLET ORAL at 12:33

## 2021-09-22 RX ADMIN — Medication 975 MILLIGRAM(S): at 12:34

## 2021-09-22 RX ADMIN — Medication 975 MILLIGRAM(S): at 12:42

## 2021-09-22 RX ADMIN — CYCLOBENZAPRINE HYDROCHLORIDE 10 MILLIGRAM(S): 10 TABLET, FILM COATED ORAL at 21:09

## 2021-09-22 RX ADMIN — HYDROMORPHONE HYDROCHLORIDE 30 MILLILITER(S): 2 INJECTION INTRAMUSCULAR; INTRAVENOUS; SUBCUTANEOUS at 08:13

## 2021-09-22 RX ADMIN — SODIUM CHLORIDE 1000 MILLILITER(S): 9 INJECTION, SOLUTION INTRAVENOUS at 23:35

## 2021-09-22 RX ADMIN — CITALOPRAM 40 MILLIGRAM(S): 10 TABLET, FILM COATED ORAL at 12:33

## 2021-09-22 RX ADMIN — ONDANSETRON 4 MILLIGRAM(S): 8 TABLET, FILM COATED ORAL at 13:22

## 2021-09-22 RX ADMIN — OXYCODONE HYDROCHLORIDE 10 MILLIGRAM(S): 5 TABLET ORAL at 13:22

## 2021-09-22 RX ADMIN — CYCLOBENZAPRINE HYDROCHLORIDE 10 MILLIGRAM(S): 10 TABLET, FILM COATED ORAL at 14:51

## 2021-09-22 RX ADMIN — PANTOPRAZOLE SODIUM 40 MILLIGRAM(S): 20 TABLET, DELAYED RELEASE ORAL at 05:40

## 2021-09-22 RX ADMIN — Medication 100 MILLIGRAM(S): at 01:13

## 2021-09-22 RX ADMIN — Medication 975 MILLIGRAM(S): at 17:30

## 2021-09-22 RX ADMIN — Medication 25 MILLIGRAM(S): at 05:40

## 2021-09-22 RX ADMIN — Medication 5 MILLIGRAM(S): at 21:09

## 2021-09-22 NOTE — CONSULT NOTE ADULT - PROBLEM SELECTOR RECOMMENDATION 5
Hgb baseline 14 --> 10 post-op  - Likely due to expected blood loss  - No active s/s of bleeding  - Monitor CBC

## 2021-09-22 NOTE — CONSULT NOTE ADULT - SUBJECTIVE AND OBJECTIVE BOX
Patient is a 61y old  Female who presents with a chief complaint of L2-S1 laminectomy, L4-5 posterior spinal fusion (21 Sep 2021 18:49)      HPI:  61F presents to PST w/ a preop dx of spinal stenosis lumbosacral region/ radiculopathy lumbar region/ spondylolisthesis lumbar region and to be evaluated for a scheduled L2-S1 laminectomy and L4-5 fusion. pt states back pain for about 6 years, worsening. undergo PT, po meds, cortisone shoots w/o relief. Pt re evaluated and recommended surgical intervention at this time w/ Dr Strauss.  (09 Sep 2021 18:42)    Patient seen and examined POD #1. States she always has R sided leg weakness/tingling in her toes - currently not worse than normal. She is having some back pain but is controlled with pain medications. Had 2 episodes of vomiting last night but feels much better this AM and is tolerating PO. No BM or passing flatus yet.     Allergies:   Contrast dye (Unknown)  IV Contrast (Swelling)      HOME MEDICATIONS: [X] Reviewed  · 	Aspirin 81mg daily   ·	amlodipine-benazepril 10 mg-40 mg oral capsule: 1 cap(s) orally once a day  · 	chlorthalidone 50 mg oral tablet: 1 tab(s) orally once a day  · 	atorvastatin 40 mg oral tablet: tab(s) orally once a day  · 	cyclobenzaprine 10 mg oral tablet: 1 tab(s) orally 3 times a day  · 	Tylenol Arthritis Caplet 650 mg oral tablet, extended release: 2 tab(s) orally every 8 hours, As Needed  · 	busPIRone 5 mg oral tablet: orally once a day (at bedtime)  · 	omeprazole 40 mg oral delayed release capsule: 1 cap(s) orally once a day  · 	multivitamin: 1 tab(s) orally once a day  · 	Vitamin C: 1 tab(s) orally once a day  · 	folic acid 1 mg oral tablet: 1 tab(s) orally once a day  · 	citalopram 40 mg oral tablet: 1 tab(s) orally once a day pm  · 	lamoTRIgine 200 mg oral tablet, extended release: 1 tab(s) orally once a day      MEDICATIONS  (STANDING):  acetaminophen   Tablet .. 975 milliGRAM(s) Oral every 8 hours  amLODIPine   Tablet 10 milliGRAM(s) Oral daily  atorvastatin 40 milliGRAM(s) Oral at bedtime  busPIRone 5 milliGRAM(s) Oral <User Schedule>  citalopram 40 milliGRAM(s) Oral daily  hydrochlorothiazide 25 milliGRAM(s) Oral daily  HYDROmorphone PCA (1 mG/mL) 30 milliLiter(s) PCA Continuous PCA Continuous  lamoTRIgine 100 milliGRAM(s) Oral two times a day  lisinopril 40 milliGRAM(s) Oral daily  pantoprazole    Tablet 40 milliGRAM(s) Oral before breakfast  polyethylene glycol 3350 17 Gram(s) Oral daily  senna 2 Tablet(s) Oral at bedtime  sodium chloride 0.9%. 1000 milliLiter(s) (125 mL/Hr) IV Continuous <Continuous>    MEDICATIONS  (PRN):  benzocaine 15 mG/menthol 3.6 mG (Sugar-Free) Lozenge 1 Lozenge Oral every 3 hours PRN Sore Throat  cyclobenzaprine 10 milliGRAM(s) Oral three times a day PRN Muscle Spasm  fluticasone propionate 50 MICROgram(s)/spray Nasal Spray 1 Spray(s) Both Nostrils two times a day PRN congestion/rhinorrhea  magnesium hydroxide Suspension 30 milliLiter(s) Oral every 12 hours PRN Constipation  naloxone Injectable 0.1 milliGRAM(s) IV Push every 3 minutes PRN For ANY of the following changes in patient status:  A. RR LESS THAN 10 breaths per minute, B. Oxygen saturation LESS THAN 90%, C. Sedation score of 6  ondansetron Injectable 4 milliGRAM(s) IV Push every 6 hours PRN Nausea      PAST MEDICAL & SURGICAL HISTORY:  Anemia  Seasonal allergies  Depression  HTN (hypertension)  GERD (gastroesophageal reflux disease)  Arthritis  Insomnia  CVA (cerebral vascular accident) 2008 with left sided weakness -resolved  Hernia  Anxiety disorder  Panic attacks  TIA (transient ischemic attack) 4 instances 2012    S/P gastric bypass  2004    S/P hip replacement  left 2011    S/P tubal ligation  1995    S/P partial hysterectomy  2005    S/P arthroscopic surgery of right knee    S/P arthroscopy of left knee    S/P endoscopy  3-24-14 removed mesh    History of hysterectomy  partial    History of left hip replacement    Failed total left knee replacement  2015    H/O gastric bypass    History of abdominoplasty    Bunion of great toe of right foot  removal of bunion      SOCIAL HISTORY:  Residence: [ ] [facility]  [ ] SNF  [X] Community  [ ] Substance abuse: denies   [ ] Tobacco: denies   [ ] Alcohol use: denies     FAMILY HISTORY:  Family history of diabetes mellitus (Mother, Sibling)  Family history of stroke (Mother)  Family history of cancer (Father)  Family history of hypertension (Mother)    REVIEW OF SYSTEMS:    CONSTITUTIONAL: No fever, weight loss, or fatigue  EYES: No eye pain, visual disturbances, or discharge  ENMT:  No difficulty hearing, tinnitus, vertigo; No sinus or throat pain  NECK: No pain or stiffness  BREASTS: No pain, masses, or nipple discharge  RESPIRATORY: No cough, wheezing, chills or hemoptysis; No shortness of breath  CARDIOVASCULAR: No chest pain, palpitations, dizziness, or leg swelling  GASTROINTESTINAL: No abdominal or epigastric pain. No nausea, vomiting, or hematemesis  GENITOURINARY: No dysuria, frequency, hematuria, or incontinence  NEUROLOGICAL: No headaches, memory loss  SKIN: No itching, burning, rashes, or lesions   LYMPH NODES: No enlarged glands  ENDOCRINE: No heat or cold intolerance; No hair loss  PSYCHIATRIC: No depression, anxiety, mood swings, or difficulty sleeping  HEME/LYMPH: No easy bruising, or bleeding gums  ALLERGY AND IMMUNOLOGIC: No hives or eczema      Vital Signs Last 24 Hrs  T(C): 36.7 (22 Sep 2021 09:35), Max: 37.1 (21 Sep 2021 21:24)  T(F): 98.1 (22 Sep 2021 09:35), Max: 98.7 (21 Sep 2021 21:24)  HR: 82 (22 Sep 2021 09:35) (59 - 84)  BP: 112/73 (22 Sep 2021 09:35) (88/57 - 143/82)  BP(mean): 76 (21 Sep 2021 16:45) (65 - 98)  RR: 18 (22 Sep 2021 09:35) (12 - 18)  SpO2: 100% (22 Sep 2021 09:35) (97% - 100%)    PHYSICAL EXAM:  GENERAL: NAD, well-groomed, well-developed  HEAD:  Atraumatic, Normocephalic  ENMT: Moist mucous membranes  RESPIRATORY: Clear to auscultation bilaterally; No rales, rhonchi, wheezing, or rubs  CARDIOVASCULAR: Regular rate and rhythm; No murmurs, rubs, or gallops  GASTROINTESTINAL: Soft, Nontender, Nondistended; Bowel sounds present  GENITOURINARY: Nixon in place   BACK: HV in place   EXTREMITIES:  2+ Peripheral Pulses, No clubbing, cyanosis, or edema  NERVOUS SYSTEM:  Alert & Oriented X3; Moving all 4 extremities; No gross sensory deficits  HEME/LYMPH: No lymphadenopathy noted  SKIN: No rashes or lesions; Incisions C/D/I    LABS:                        10.0   8.21  )-----------( 171      ( 22 Sep 2021 07:05 )             30.7     Hemoglobin: 10.0 g/dL (09-22 @ 07:05)  Hemoglobin: 10.2 g/dL (09-21 @ 14:35)    09-22    137  |  103  |  18  ----------------------------<  103<H>  4.7   |  24  |  0.84    Ca    9.4      22 Sep 2021 07:05                  [ ] Consultant(s) Notes Reviewed  [X] Care Discussed with Consultants/Other Providers: Ortho PA

## 2021-09-22 NOTE — PHYSICAL THERAPY INITIAL EVALUATION ADULT - IMPAIRMENTS CONTRIBUTING TO GAIT DEVIATIONS, PT EVAL
bilateral knee buckling multiple times ; pt would benefit from knee immobilizers to prevent bilateral knee buckling/impaired balance/narrow base of support/decreased strength

## 2021-09-22 NOTE — OCCUPATIONAL THERAPY INITIAL EVALUATION ADULT - PERTINENT HX OF CURRENT PROBLEM, REHAB EVAL
60 y/o F presents to PST w/ a preop dx of spinal stenosis lumbosacral region/ radiculopathy lumbar region/ spondylolisthesis lumbar region and to be evaluated for a scheduled L2-S1 laminectomy and L4-5 fusion. pt states back pain for about 6 years, worsening. undergo PT, po meds, cortisone shoots w/o relief. Pt is a 62 y/o female presents to PST w/ a preop dx of spinal stenosis lumbosacral region/ radiculopathy lumbar region/ spondylolisthesis lumbar region and to be evaluated for a scheduled L2-S1 laminectomy and L4-5 fusion. pt states back pain for about 6 years, worsening. undergo PT, po meds, cortisone shoots w/o relief.

## 2021-09-22 NOTE — CONSULT NOTE ADULT - ASSESSMENT
61F hx of depression/bipolar disorder, CVA w/ residual L sided weakness, HTN, OA, spinal stenosis s/p L2-S1 lami, L4-5 fusion.

## 2021-09-22 NOTE — PHYSICAL THERAPY INITIAL EVALUATION ADULT - PATIENT PROFILE REVIEW, REHAB EVAL
activity order- OOB , ambulate as tolerated with assistance , ok to perform PT evaluation as per RN Misti/yes

## 2021-09-22 NOTE — CONSULT NOTE ADULT - PROBLEM SELECTOR RECOMMENDATION 2
W/ residual L sided weakness  - C/w home statin   - Takes ASA 81mg, on hold for now. Restart once cleared by ortho

## 2021-09-22 NOTE — CONSULT NOTE ADULT - PROBLEM SELECTOR RECOMMENDATION 9
s/p L2-S1 lami, L4-5 fusion, POD #1   - Care per primary ortho team   - Multimodal pain control + bowel regimen   - WBAT  - PT/OT/OOB  - Encourage I/S  - Monitor HMV output  - DVT ppx: SCDs

## 2021-09-22 NOTE — PHYSICAL THERAPY INITIAL EVALUATION ADULT - LEVEL OF INDEPENDENCE: GAIT, REHAB EVAL
due to bilateral knee buckling at times/minimum assist (75% patients effort)/moderate assist (50% patients effort)

## 2021-09-22 NOTE — PHYSICAL THERAPY INITIAL EVALUATION ADULT - GENERAL OBSERVATIONS, REHAB EVAL
Patient received semi supine in bed, (+) hemovac, (+) IV, (+) raymundo , (+) LSO brace, pt noted with bilateral knee buckling corrected with vc's to keep bilateral knees locked in extension during OOB activities/ambulation.

## 2021-09-22 NOTE — PHYSICAL THERAPY INITIAL EVALUATION ADULT - LIVES WITH, PROFILE
3 daughters in an apartment , 6th floor , elevator to the 5th floor then to climb 1 flight of steps to the 6th floor/children

## 2021-09-23 DIAGNOSIS — R55 SYNCOPE AND COLLAPSE: ICD-10-CM

## 2021-09-23 LAB
ALBUMIN SERPL ELPH-MCNC: 3.2 G/DL — LOW (ref 3.3–5)
ALBUMIN SERPL ELPH-MCNC: 3.2 G/DL — LOW (ref 3.3–5)
ALP SERPL-CCNC: 68 U/L — SIGNIFICANT CHANGE UP (ref 40–120)
ALP SERPL-CCNC: 70 U/L — SIGNIFICANT CHANGE UP (ref 40–120)
ALT FLD-CCNC: 14 U/L — SIGNIFICANT CHANGE UP (ref 4–33)
ALT FLD-CCNC: 15 U/L — SIGNIFICANT CHANGE UP (ref 4–33)
ANION GAP SERPL CALC-SCNC: 12 MMOL/L — SIGNIFICANT CHANGE UP (ref 7–14)
ANION GAP SERPL CALC-SCNC: 7 MMOL/L — SIGNIFICANT CHANGE UP (ref 7–14)
ANION GAP SERPL CALC-SCNC: 8 MMOL/L — SIGNIFICANT CHANGE UP (ref 7–14)
AST SERPL-CCNC: 27 U/L — SIGNIFICANT CHANGE UP (ref 4–32)
AST SERPL-CCNC: 28 U/L — SIGNIFICANT CHANGE UP (ref 4–32)
BASOPHILS # BLD AUTO: 0.02 K/UL — SIGNIFICANT CHANGE UP (ref 0–0.2)
BASOPHILS NFR BLD AUTO: 0.3 % — SIGNIFICANT CHANGE UP (ref 0–2)
BILIRUB SERPL-MCNC: <0.2 MG/DL — SIGNIFICANT CHANGE UP (ref 0.2–1.2)
BILIRUB SERPL-MCNC: <0.2 MG/DL — SIGNIFICANT CHANGE UP (ref 0.2–1.2)
BUN SERPL-MCNC: 16 MG/DL — SIGNIFICANT CHANGE UP (ref 7–23)
BUN SERPL-MCNC: 18 MG/DL — SIGNIFICANT CHANGE UP (ref 7–23)
BUN SERPL-MCNC: 19 MG/DL — SIGNIFICANT CHANGE UP (ref 7–23)
CALCIUM SERPL-MCNC: 8.8 MG/DL — SIGNIFICANT CHANGE UP (ref 8.4–10.5)
CALCIUM SERPL-MCNC: 9 MG/DL — SIGNIFICANT CHANGE UP (ref 8.4–10.5)
CALCIUM SERPL-MCNC: 9.1 MG/DL — SIGNIFICANT CHANGE UP (ref 8.4–10.5)
CHLORIDE SERPL-SCNC: 104 MMOL/L — SIGNIFICANT CHANGE UP (ref 98–107)
CHLORIDE SERPL-SCNC: 105 MMOL/L — SIGNIFICANT CHANGE UP (ref 98–107)
CHLORIDE SERPL-SCNC: 106 MMOL/L — SIGNIFICANT CHANGE UP (ref 98–107)
CO2 SERPL-SCNC: 22 MMOL/L — SIGNIFICANT CHANGE UP (ref 22–31)
CO2 SERPL-SCNC: 25 MMOL/L — SIGNIFICANT CHANGE UP (ref 22–31)
CO2 SERPL-SCNC: 26 MMOL/L — SIGNIFICANT CHANGE UP (ref 22–31)
CREAT SERPL-MCNC: 0.77 MG/DL — SIGNIFICANT CHANGE UP (ref 0.5–1.3)
CREAT SERPL-MCNC: 0.92 MG/DL — SIGNIFICANT CHANGE UP (ref 0.5–1.3)
CREAT SERPL-MCNC: 0.97 MG/DL — SIGNIFICANT CHANGE UP (ref 0.5–1.3)
D DIMER BLD IA.RAPID-MCNC: 266 NG/ML DDU — HIGH
EOSINOPHIL # BLD AUTO: 0.07 K/UL — SIGNIFICANT CHANGE UP (ref 0–0.5)
EOSINOPHIL NFR BLD AUTO: 1 % — SIGNIFICANT CHANGE UP (ref 0–6)
GAS PNL BLDA: SIGNIFICANT CHANGE UP
GLUCOSE SERPL-MCNC: 109 MG/DL — HIGH (ref 70–99)
GLUCOSE SERPL-MCNC: 110 MG/DL — HIGH (ref 70–99)
GLUCOSE SERPL-MCNC: 135 MG/DL — HIGH (ref 70–99)
HCT VFR BLD CALC: 28.6 % — LOW (ref 34.5–45)
HGB BLD-MCNC: 9.1 G/DL — LOW (ref 11.5–15.5)
IANC: 5.35 K/UL — SIGNIFICANT CHANGE UP (ref 1.5–8.5)
IMM GRANULOCYTES NFR BLD AUTO: 0.4 % — SIGNIFICANT CHANGE UP (ref 0–1.5)
LYMPHOCYTES # BLD AUTO: 0.82 K/UL — LOW (ref 1–3.3)
LYMPHOCYTES # BLD AUTO: 11.8 % — LOW (ref 13–44)
MAGNESIUM SERPL-MCNC: 1.9 MG/DL — SIGNIFICANT CHANGE UP (ref 1.6–2.6)
MCHC RBC-ENTMCNC: 29.3 PG — SIGNIFICANT CHANGE UP (ref 27–34)
MCHC RBC-ENTMCNC: 31.8 GM/DL — LOW (ref 32–36)
MCV RBC AUTO: 92 FL — SIGNIFICANT CHANGE UP (ref 80–100)
MONOCYTES # BLD AUTO: 0.65 K/UL — SIGNIFICANT CHANGE UP (ref 0–0.9)
MONOCYTES NFR BLD AUTO: 9.4 % — SIGNIFICANT CHANGE UP (ref 2–14)
NEUTROPHILS # BLD AUTO: 5.35 K/UL — SIGNIFICANT CHANGE UP (ref 1.8–7.4)
NEUTROPHILS NFR BLD AUTO: 77.1 % — HIGH (ref 43–77)
NRBC # BLD: 0 /100 WBCS — SIGNIFICANT CHANGE UP
NRBC # FLD: 0 K/UL — SIGNIFICANT CHANGE UP
PLATELET # BLD AUTO: 149 K/UL — LOW (ref 150–400)
POTASSIUM SERPL-MCNC: 4.1 MMOL/L — SIGNIFICANT CHANGE UP (ref 3.5–5.3)
POTASSIUM SERPL-MCNC: 4.2 MMOL/L — SIGNIFICANT CHANGE UP (ref 3.5–5.3)
POTASSIUM SERPL-MCNC: 4.4 MMOL/L — SIGNIFICANT CHANGE UP (ref 3.5–5.3)
POTASSIUM SERPL-SCNC: 4.1 MMOL/L — SIGNIFICANT CHANGE UP (ref 3.5–5.3)
POTASSIUM SERPL-SCNC: 4.2 MMOL/L — SIGNIFICANT CHANGE UP (ref 3.5–5.3)
POTASSIUM SERPL-SCNC: 4.4 MMOL/L — SIGNIFICANT CHANGE UP (ref 3.5–5.3)
PROT SERPL-MCNC: 5.5 G/DL — LOW (ref 6–8.3)
PROT SERPL-MCNC: 5.6 G/DL — LOW (ref 6–8.3)
RBC # BLD: 3.11 M/UL — LOW (ref 3.8–5.2)
RBC # FLD: 14.6 % — HIGH (ref 10.3–14.5)
SODIUM SERPL-SCNC: 138 MMOL/L — SIGNIFICANT CHANGE UP (ref 135–145)
SODIUM SERPL-SCNC: 138 MMOL/L — SIGNIFICANT CHANGE UP (ref 135–145)
SODIUM SERPL-SCNC: 139 MMOL/L — SIGNIFICANT CHANGE UP (ref 135–145)
WBC # BLD: 6.94 K/UL — SIGNIFICANT CHANGE UP (ref 3.8–10.5)
WBC # FLD AUTO: 6.94 K/UL — SIGNIFICANT CHANGE UP (ref 3.8–10.5)

## 2021-09-23 PROCEDURE — 99233 SBSQ HOSP IP/OBS HIGH 50: CPT

## 2021-09-23 RX ORDER — OXYCODONE HYDROCHLORIDE 5 MG/1
5 TABLET ORAL
Refills: 0 | Status: DISCONTINUED | OUTPATIENT
Start: 2021-09-23 | End: 2021-09-30

## 2021-09-23 RX ORDER — OXYCODONE HYDROCHLORIDE 5 MG/1
2.5 TABLET ORAL
Refills: 0 | Status: DISCONTINUED | OUTPATIENT
Start: 2021-09-23 | End: 2021-09-23

## 2021-09-23 RX ORDER — CYCLOBENZAPRINE HYDROCHLORIDE 10 MG/1
5 TABLET, FILM COATED ORAL THREE TIMES A DAY
Refills: 0 | Status: DISCONTINUED | OUTPATIENT
Start: 2021-09-23 | End: 2021-09-24

## 2021-09-23 RX ADMIN — CYCLOBENZAPRINE HYDROCHLORIDE 10 MILLIGRAM(S): 10 TABLET, FILM COATED ORAL at 05:45

## 2021-09-23 RX ADMIN — LAMOTRIGINE 100 MILLIGRAM(S): 25 TABLET, ORALLY DISINTEGRATING ORAL at 18:29

## 2021-09-23 RX ADMIN — SENNA PLUS 2 TABLET(S): 8.6 TABLET ORAL at 22:39

## 2021-09-23 RX ADMIN — AMLODIPINE BESYLATE 10 MILLIGRAM(S): 2.5 TABLET ORAL at 05:46

## 2021-09-23 RX ADMIN — Medication 975 MILLIGRAM(S): at 19:26

## 2021-09-23 RX ADMIN — CITALOPRAM 40 MILLIGRAM(S): 10 TABLET, FILM COATED ORAL at 12:12

## 2021-09-23 RX ADMIN — Medication 975 MILLIGRAM(S): at 10:45

## 2021-09-23 RX ADMIN — OXYCODONE HYDROCHLORIDE 5 MILLIGRAM(S): 5 TABLET ORAL at 11:30

## 2021-09-23 RX ADMIN — CYCLOBENZAPRINE HYDROCHLORIDE 5 MILLIGRAM(S): 10 TABLET, FILM COATED ORAL at 14:30

## 2021-09-23 RX ADMIN — CYCLOBENZAPRINE HYDROCHLORIDE 5 MILLIGRAM(S): 10 TABLET, FILM COATED ORAL at 22:39

## 2021-09-23 RX ADMIN — OXYCODONE HYDROCHLORIDE 5 MILLIGRAM(S): 5 TABLET ORAL at 10:45

## 2021-09-23 RX ADMIN — Medication 25 MILLIGRAM(S): at 05:46

## 2021-09-23 RX ADMIN — LISINOPRIL 40 MILLIGRAM(S): 2.5 TABLET ORAL at 05:46

## 2021-09-23 RX ADMIN — Medication 5 MILLIGRAM(S): at 22:39

## 2021-09-23 RX ADMIN — Medication 975 MILLIGRAM(S): at 02:03

## 2021-09-23 RX ADMIN — ATORVASTATIN CALCIUM 40 MILLIGRAM(S): 80 TABLET, FILM COATED ORAL at 22:39

## 2021-09-23 RX ADMIN — LAMOTRIGINE 100 MILLIGRAM(S): 25 TABLET, ORALLY DISINTEGRATING ORAL at 07:35

## 2021-09-23 RX ADMIN — PANTOPRAZOLE SODIUM 40 MILLIGRAM(S): 20 TABLET, DELAYED RELEASE ORAL at 05:46

## 2021-09-23 RX ADMIN — POLYETHYLENE GLYCOL 3350 17 GRAM(S): 17 POWDER, FOR SOLUTION ORAL at 12:12

## 2021-09-23 NOTE — PROVIDER CONTACT NOTE (OTHER) - SITUATION
Pt's left hand IV infiltrated with arm and hand swelling. Pt not able to unable to bend fingers Pt's right hand IV infiltrated with arm and hand swelling. Pt not able to unable to bend fingers

## 2021-09-23 NOTE — RAPID RESPONSE TEAM SUMMARY - NSADDTLFINDINGSRRT_GEN_ALL_CORE
PAST MEDICAL & SURGICAL HISTORY:  Anemia  Seasonal allergies  Depression  HTN (hypertension)  GERD (gastroesophageal reflux disease)  Arthritis  Insomnia  CVA (cerebral vascular accident) 2008 with left sided weakness -resolved  Hernia  Anxiety disorder  Panic attacks  TIA (transient ischemic attack) 4 instances 2012    S/P gastric bypass  2004    S/P hip replacement  left 2011    S/P tubal ligation  1995    S/P partial hysterectomy  2005    S/P arthroscopic surgery of right knee    S/P arthroscopy of left knee    S/P endoscopy  3-24-14 removed mesh    History of hysterectomy  partial    History of left hip replacement    Failed total left knee replacement  2015    H/O gastric bypass    History of abdominoplasty    Bunion of great toe of right foot  removal of bunion

## 2021-09-23 NOTE — RAPID RESPONSE TEAM SUMMARY - NSSITUATIONBACKGROUNDRRT_GEN_ALL_CORE
60 y/o F presents to PST w/ a preop dx of spinal stenosis lumbosacral region/ radiculopathy lumbar region/ spondylolisthesis lumbar region and to be evaluated for a scheduled L2-S1 laminectomy and L4-5 fusion. Procedure performed on 9/21/21. POD 2, patient went to urinate on the commode and had a syncopal episode, associated with AMS and hypotension. Patient received 1L bolus LR, EKG, 2L NC, and labs. Patient's BP normalized, labs WNL, and EKG NSR

## 2021-09-24 LAB
ANION GAP SERPL CALC-SCNC: 11 MMOL/L — SIGNIFICANT CHANGE UP (ref 7–14)
BASOPHILS # BLD AUTO: 0.02 K/UL — SIGNIFICANT CHANGE UP (ref 0–0.2)
BASOPHILS NFR BLD AUTO: 0.3 % — SIGNIFICANT CHANGE UP (ref 0–2)
BUN SERPL-MCNC: 12 MG/DL — SIGNIFICANT CHANGE UP (ref 7–23)
CALCIUM SERPL-MCNC: 9.5 MG/DL — SIGNIFICANT CHANGE UP (ref 8.4–10.5)
CHLORIDE SERPL-SCNC: 100 MMOL/L — SIGNIFICANT CHANGE UP (ref 98–107)
CO2 SERPL-SCNC: 27 MMOL/L — SIGNIFICANT CHANGE UP (ref 22–31)
CREAT SERPL-MCNC: 0.69 MG/DL — SIGNIFICANT CHANGE UP (ref 0.5–1.3)
EOSINOPHIL # BLD AUTO: 0.11 K/UL — SIGNIFICANT CHANGE UP (ref 0–0.5)
EOSINOPHIL NFR BLD AUTO: 1.5 % — SIGNIFICANT CHANGE UP (ref 0–6)
GLUCOSE SERPL-MCNC: 90 MG/DL — SIGNIFICANT CHANGE UP (ref 70–99)
HCT VFR BLD CALC: 28.1 % — LOW (ref 34.5–45)
HGB BLD-MCNC: 9.1 G/DL — LOW (ref 11.5–15.5)
IANC: 5.32 K/UL — SIGNIFICANT CHANGE UP (ref 1.5–8.5)
IMM GRANULOCYTES NFR BLD AUTO: 0.5 % — SIGNIFICANT CHANGE UP (ref 0–1.5)
LYMPHOCYTES # BLD AUTO: 1.26 K/UL — SIGNIFICANT CHANGE UP (ref 1–3.3)
LYMPHOCYTES # BLD AUTO: 16.8 % — SIGNIFICANT CHANGE UP (ref 13–44)
MCHC RBC-ENTMCNC: 29.5 PG — SIGNIFICANT CHANGE UP (ref 27–34)
MCHC RBC-ENTMCNC: 32.4 GM/DL — SIGNIFICANT CHANGE UP (ref 32–36)
MCV RBC AUTO: 91.2 FL — SIGNIFICANT CHANGE UP (ref 80–100)
MONOCYTES # BLD AUTO: 0.75 K/UL — SIGNIFICANT CHANGE UP (ref 0–0.9)
MONOCYTES NFR BLD AUTO: 10 % — SIGNIFICANT CHANGE UP (ref 2–14)
NEUTROPHILS # BLD AUTO: 5.32 K/UL — SIGNIFICANT CHANGE UP (ref 1.8–7.4)
NEUTROPHILS NFR BLD AUTO: 70.9 % — SIGNIFICANT CHANGE UP (ref 43–77)
NRBC # BLD: 0 /100 WBCS — SIGNIFICANT CHANGE UP
NRBC # FLD: 0 K/UL — SIGNIFICANT CHANGE UP
PLATELET # BLD AUTO: 167 K/UL — SIGNIFICANT CHANGE UP (ref 150–400)
POTASSIUM SERPL-MCNC: 3.9 MMOL/L — SIGNIFICANT CHANGE UP (ref 3.5–5.3)
POTASSIUM SERPL-SCNC: 3.9 MMOL/L — SIGNIFICANT CHANGE UP (ref 3.5–5.3)
RBC # BLD: 3.08 M/UL — LOW (ref 3.8–5.2)
RBC # FLD: 14.7 % — HIGH (ref 10.3–14.5)
SODIUM SERPL-SCNC: 138 MMOL/L — SIGNIFICANT CHANGE UP (ref 135–145)
WBC # BLD: 7.5 K/UL — SIGNIFICANT CHANGE UP (ref 3.8–10.5)
WBC # FLD AUTO: 7.5 K/UL — SIGNIFICANT CHANGE UP (ref 3.8–10.5)

## 2021-09-24 PROCEDURE — 90792 PSYCH DIAG EVAL W/MED SRVCS: CPT

## 2021-09-24 PROCEDURE — 99232 SBSQ HOSP IP/OBS MODERATE 35: CPT

## 2021-09-24 RX ORDER — ASPIRIN/CALCIUM CARB/MAGNESIUM 324 MG
81 TABLET ORAL DAILY
Refills: 0 | Status: DISCONTINUED | OUTPATIENT
Start: 2021-09-24 | End: 2021-09-30

## 2021-09-24 RX ADMIN — CYCLOBENZAPRINE HYDROCHLORIDE 5 MILLIGRAM(S): 10 TABLET, FILM COATED ORAL at 06:28

## 2021-09-24 RX ADMIN — Medication 975 MILLIGRAM(S): at 10:26

## 2021-09-24 RX ADMIN — Medication 975 MILLIGRAM(S): at 03:25

## 2021-09-24 RX ADMIN — PANTOPRAZOLE SODIUM 40 MILLIGRAM(S): 20 TABLET, DELAYED RELEASE ORAL at 06:29

## 2021-09-24 RX ADMIN — OXYCODONE HYDROCHLORIDE 5 MILLIGRAM(S): 5 TABLET ORAL at 18:42

## 2021-09-24 RX ADMIN — CITALOPRAM 40 MILLIGRAM(S): 10 TABLET, FILM COATED ORAL at 12:45

## 2021-09-24 RX ADMIN — POLYETHYLENE GLYCOL 3350 17 GRAM(S): 17 POWDER, FOR SOLUTION ORAL at 12:45

## 2021-09-24 RX ADMIN — OXYCODONE HYDROCHLORIDE 5 MILLIGRAM(S): 5 TABLET ORAL at 20:00

## 2021-09-24 RX ADMIN — Medication 975 MILLIGRAM(S): at 18:42

## 2021-09-24 RX ADMIN — LAMOTRIGINE 100 MILLIGRAM(S): 25 TABLET, ORALLY DISINTEGRATING ORAL at 06:29

## 2021-09-24 RX ADMIN — OXYCODONE HYDROCHLORIDE 5 MILLIGRAM(S): 5 TABLET ORAL at 10:58

## 2021-09-24 RX ADMIN — Medication 5 MILLIGRAM(S): at 21:12

## 2021-09-24 RX ADMIN — OXYCODONE HYDROCHLORIDE 5 MILLIGRAM(S): 5 TABLET ORAL at 10:13

## 2021-09-24 RX ADMIN — Medication 81 MILLIGRAM(S): at 12:45

## 2021-09-24 RX ADMIN — SENNA PLUS 2 TABLET(S): 8.6 TABLET ORAL at 21:12

## 2021-09-24 RX ADMIN — ATORVASTATIN CALCIUM 40 MILLIGRAM(S): 80 TABLET, FILM COATED ORAL at 21:12

## 2021-09-24 NOTE — BH CONSULTATION LIAISON ASSESSMENT NOTE - HPI (INCLUDE ILLNESS QUALITY, SEVERITY, DURATION, TIMING, CONTEXT, MODIFYING FACTORS, ASSOCIATED SIGNS AND SYMPTOMS)
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 Psychiatry consulted for worsening depression.     Patient seen at bedside AOx3, calm, cooperative, engaged, appears melancholy and is endorsing feeling sad in the setting of  leaving her in 2017, the loss of her son in 2020, and now s/p surgery and suffering back pain. Patient reports previously seeing a psychiatrist Dr. Julian in [place], however, has not followed up since before [facility]. Patient states she is not taking any psychiatric medications - Buspar, Celexa, and Lamictal have not been filled since 2017 as per pharmacy listed. Patient is looking forward to attending rehab then returning home where family is available for support w/ ADLs. Denies any family or self history of suicidal behavior - Currently denies SI/HI/AH/VH. Does not recall any episodes of mickey.    Spoke to daughter Kenna who confirmed patient's psychiatric history of depression for many years and has not been on medications for quite some time or followed up with a psychiatrist. Daughter has no safety concerns in terms of self-harm. Daughter also denies any h/o seizure disorders or diagnosis of bipolar disorder.

## 2021-09-24 NOTE — BH CONSULTATION LIAISON ASSESSMENT NOTE - RISK ASSESSMENT
Risk factors: Loss of son, divorce, acute pain and medical issue, family history  Protective factors: Financial and residential stability, familial support, goal and future-oriented, no h/o suicidality

## 2021-09-24 NOTE — BH CONSULTATION LIAISON ASSESSMENT NOTE - SUMMARY
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 Psychiatry consulted for worsening depression.     Overall, patient is calm, cooperative, engaged, appears melancholy and is endorsing feeling sad in the setting of  leaving her in 2017, the loss of her son in 2020, and now s/p surgery and suffering back pain. Has not been on psychiatric medications since at least 2017 and has not followed up with psychiatry since before [facility]. Patient is looking forward to attending rehab then returning home where family is available for support w/ ADLs. Denies any family or self history of suicidal behavior - Currently denies SI/HI/AH/VH.    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 Psychiatry consulted for worsening depression.     Overall, patient is calm, cooperative, engaged, appears melancholy and is endorsing feeling sad in the setting of  leaving her in 2017, the loss of her son in 2020, and now s/p surgery and suffering back pain. Has not been on psychiatric medications since at least 2017 and has not followed up with psychiatry since before [facility]. Patient is looking forward to attending rehab then returning home where family is available for support w/ ADLs. Denies any family or self history of suicidal behavior - Currently denies SI/HI/AH/VH.    PLAN:  - Routine observation   - Can obtain TSH, B12, Folate levels  - DISCONTINUE psychiatric regimen including Buspar, Celexa, and Lamictal  	> monitor skin for possible rash, SJS development as was on high dose of lamictal for a few days  - May provide patient w/ the following outpatient referrals upon DC: [facility] [phone] (Walk-in/appt M-F 9am-7pm); [address] [place], [place]  - No further psychiatric recommendations

## 2021-09-24 NOTE — BH CONSULTATION LIAISON ASSESSMENT NOTE - NSICDXPASTMEDICALHX_GEN_ALL_CORE_FT
PAST MEDICAL HISTORY:  Anemia     Anxiety disorder     Arthritis     Bipolar disorder     CVA (cerebral vascular accident) 2008 with left sided weakness -resolved    Depression     Depression     GERD (gastroesophageal reflux disease)     Hernia     HTN (hypertension)     Hypertension     Insomnia     Iron deficiency anemia     Osteoarthritis     Panic attacks     Seasonal allergies     Stroke 2 instances in 2008. Left sided weakness residual, left sided facial droopiness    TIA (transient ischemic attack) 4 instances 2012

## 2021-09-24 NOTE — BH CONSULTATION LIAISON ASSESSMENT NOTE - NSBHCHARTREVIEWVS_PSY_A_CORE FT
Vital Signs Last 24 Hrs  T(C): 37.2 (24 Sep 2021 13:54), Max: 37.4 (23 Sep 2021 22:13)  T(F): 98.9 (24 Sep 2021 13:54), Max: 99.3 (23 Sep 2021 22:13)  HR: 63 (24 Sep 2021 13:54) (63 - 84)  BP: 108/68 (24 Sep 2021 13:54) (108/68 - 124/75)  BP(mean): --  RR: 16 (24 Sep 2021 13:54) (16 - 17)  SpO2: 100% (24 Sep 2021 13:54) (97% - 100%)

## 2021-09-24 NOTE — BH CONSULTATION LIAISON ASSESSMENT NOTE - CURRENT MEDICATION
MEDICATIONS  (STANDING):  acetaminophen   Tablet .. 975 milliGRAM(s) Oral every 8 hours  amLODIPine   Tablet 10 milliGRAM(s) Oral daily  aspirin enteric coated 81 milliGRAM(s) Oral daily  atorvastatin 40 milliGRAM(s) Oral at bedtime  busPIRone 5 milliGRAM(s) Oral <User Schedule>  citalopram 40 milliGRAM(s) Oral daily  lamoTRIgine 100 milliGRAM(s) Oral two times a day  pantoprazole    Tablet 40 milliGRAM(s) Oral before breakfast  polyethylene glycol 3350 17 Gram(s) Oral daily  senna 2 Tablet(s) Oral at bedtime    MEDICATIONS  (PRN):  benzocaine 15 mG/menthol 3.6 mG (Sugar-Free) Lozenge 1 Lozenge Oral every 3 hours PRN Sore Throat  fluticasone propionate 50 MICROgram(s)/spray Nasal Spray 1 Spray(s) Both Nostrils two times a day PRN congestion/rhinorrhea  magnesium hydroxide Suspension 30 milliLiter(s) Oral every 12 hours PRN Constipation  naloxone Injectable 0.1 milliGRAM(s) IV Push every 3 minutes PRN For ANY of the following changes in patient status:  A. RR LESS THAN 10 breaths per minute, B. Oxygen saturation LESS THAN 90%, C. Sedation score of 6  ondansetron Injectable 4 milliGRAM(s) IV Push every 6 hours PRN Nausea  oxyCODONE    IR 5 milliGRAM(s) Oral every 3 hours PRN Severe Pain (7 - 10)  oxyCODONE    IR 2.5 milliGRAM(s) Oral every 3 hours PRN Moderate Pain (4 - 6)

## 2021-09-25 LAB
FOLATE SERPL-MCNC: 17.3 NG/ML — SIGNIFICANT CHANGE UP (ref 3.1–17.5)
SURGICAL PATHOLOGY STUDY: SIGNIFICANT CHANGE UP
TSH SERPL-MCNC: 0.53 UIU/ML — SIGNIFICANT CHANGE UP (ref 0.27–4.2)
VIT B12 SERPL-MCNC: 1153 PG/ML — HIGH (ref 200–900)

## 2021-09-25 PROCEDURE — 99232 SBSQ HOSP IP/OBS MODERATE 35: CPT

## 2021-09-25 RX ADMIN — PANTOPRAZOLE SODIUM 40 MILLIGRAM(S): 20 TABLET, DELAYED RELEASE ORAL at 05:42

## 2021-09-25 RX ADMIN — SENNA PLUS 2 TABLET(S): 8.6 TABLET ORAL at 21:20

## 2021-09-25 RX ADMIN — OXYCODONE HYDROCHLORIDE 5 MILLIGRAM(S): 5 TABLET ORAL at 01:08

## 2021-09-25 RX ADMIN — OXYCODONE HYDROCHLORIDE 5 MILLIGRAM(S): 5 TABLET ORAL at 14:48

## 2021-09-25 RX ADMIN — ATORVASTATIN CALCIUM 40 MILLIGRAM(S): 80 TABLET, FILM COATED ORAL at 21:20

## 2021-09-25 RX ADMIN — Medication 5 MILLIGRAM(S): at 21:49

## 2021-09-25 RX ADMIN — Medication 975 MILLIGRAM(S): at 10:45

## 2021-09-25 RX ADMIN — Medication 975 MILLIGRAM(S): at 16:52

## 2021-09-25 RX ADMIN — Medication 81 MILLIGRAM(S): at 09:56

## 2021-09-25 RX ADMIN — Medication 975 MILLIGRAM(S): at 09:56

## 2021-09-25 RX ADMIN — OXYCODONE HYDROCHLORIDE 5 MILLIGRAM(S): 5 TABLET ORAL at 14:34

## 2021-09-25 RX ADMIN — AMLODIPINE BESYLATE 10 MILLIGRAM(S): 2.5 TABLET ORAL at 05:42

## 2021-09-25 RX ADMIN — OXYCODONE HYDROCHLORIDE 5 MILLIGRAM(S): 5 TABLET ORAL at 21:21

## 2021-09-25 RX ADMIN — Medication 975 MILLIGRAM(S): at 02:33

## 2021-09-25 RX ADMIN — Medication 975 MILLIGRAM(S): at 18:12

## 2021-09-25 RX ADMIN — OXYCODONE HYDROCHLORIDE 5 MILLIGRAM(S): 5 TABLET ORAL at 22:00

## 2021-09-25 RX ADMIN — POLYETHYLENE GLYCOL 3350 17 GRAM(S): 17 POWDER, FOR SOLUTION ORAL at 09:56

## 2021-09-26 PROCEDURE — 99232 SBSQ HOSP IP/OBS MODERATE 35: CPT

## 2021-09-26 RX ADMIN — OXYCODONE HYDROCHLORIDE 5 MILLIGRAM(S): 5 TABLET ORAL at 02:22

## 2021-09-26 RX ADMIN — OXYCODONE HYDROCHLORIDE 5 MILLIGRAM(S): 5 TABLET ORAL at 12:35

## 2021-09-26 RX ADMIN — PANTOPRAZOLE SODIUM 40 MILLIGRAM(S): 20 TABLET, DELAYED RELEASE ORAL at 05:29

## 2021-09-26 RX ADMIN — Medication 975 MILLIGRAM(S): at 12:26

## 2021-09-26 RX ADMIN — MAGNESIUM HYDROXIDE 30 MILLILITER(S): 400 TABLET, CHEWABLE ORAL at 09:15

## 2021-09-26 RX ADMIN — Medication 975 MILLIGRAM(S): at 18:47

## 2021-09-26 RX ADMIN — SENNA PLUS 2 TABLET(S): 8.6 TABLET ORAL at 21:41

## 2021-09-26 RX ADMIN — ATORVASTATIN CALCIUM 40 MILLIGRAM(S): 80 TABLET, FILM COATED ORAL at 21:40

## 2021-09-26 RX ADMIN — OXYCODONE HYDROCHLORIDE 5 MILLIGRAM(S): 5 TABLET ORAL at 21:41

## 2021-09-26 RX ADMIN — Medication 5 MILLIGRAM(S): at 21:40

## 2021-09-26 RX ADMIN — OXYCODONE HYDROCHLORIDE 5 MILLIGRAM(S): 5 TABLET ORAL at 12:27

## 2021-09-26 RX ADMIN — Medication 10 MILLIGRAM(S): at 09:55

## 2021-09-26 RX ADMIN — Medication 975 MILLIGRAM(S): at 05:29

## 2021-09-26 RX ADMIN — AMLODIPINE BESYLATE 10 MILLIGRAM(S): 2.5 TABLET ORAL at 05:28

## 2021-09-26 RX ADMIN — OXYCODONE HYDROCHLORIDE 5 MILLIGRAM(S): 5 TABLET ORAL at 03:00

## 2021-09-26 RX ADMIN — Medication 975 MILLIGRAM(S): at 12:35

## 2021-09-26 RX ADMIN — OXYCODONE HYDROCHLORIDE 5 MILLIGRAM(S): 5 TABLET ORAL at 22:10

## 2021-09-26 RX ADMIN — Medication 81 MILLIGRAM(S): at 12:27

## 2021-09-27 ENCOUNTER — TRANSCRIPTION ENCOUNTER (OUTPATIENT)
Age: 61
End: 2021-09-27

## 2021-09-27 PROCEDURE — 99232 SBSQ HOSP IP/OBS MODERATE 35: CPT

## 2021-09-27 RX ADMIN — OXYCODONE HYDROCHLORIDE 5 MILLIGRAM(S): 5 TABLET ORAL at 02:40

## 2021-09-27 RX ADMIN — Medication 975 MILLIGRAM(S): at 02:09

## 2021-09-27 RX ADMIN — OXYCODONE HYDROCHLORIDE 5 MILLIGRAM(S): 5 TABLET ORAL at 21:27

## 2021-09-27 RX ADMIN — Medication 975 MILLIGRAM(S): at 11:52

## 2021-09-27 RX ADMIN — Medication 975 MILLIGRAM(S): at 18:20

## 2021-09-27 RX ADMIN — Medication 81 MILLIGRAM(S): at 11:53

## 2021-09-27 RX ADMIN — PANTOPRAZOLE SODIUM 40 MILLIGRAM(S): 20 TABLET, DELAYED RELEASE ORAL at 06:12

## 2021-09-27 RX ADMIN — OXYCODONE HYDROCHLORIDE 5 MILLIGRAM(S): 5 TABLET ORAL at 09:20

## 2021-09-27 RX ADMIN — OXYCODONE HYDROCHLORIDE 5 MILLIGRAM(S): 5 TABLET ORAL at 22:15

## 2021-09-27 RX ADMIN — OXYCODONE HYDROCHLORIDE 5 MILLIGRAM(S): 5 TABLET ORAL at 10:30

## 2021-09-27 RX ADMIN — Medication 5 MILLIGRAM(S): at 21:27

## 2021-09-27 RX ADMIN — AMLODIPINE BESYLATE 10 MILLIGRAM(S): 2.5 TABLET ORAL at 06:12

## 2021-09-27 RX ADMIN — OXYCODONE HYDROCHLORIDE 5 MILLIGRAM(S): 5 TABLET ORAL at 02:09

## 2021-09-27 RX ADMIN — ATORVASTATIN CALCIUM 40 MILLIGRAM(S): 80 TABLET, FILM COATED ORAL at 21:28

## 2021-09-27 NOTE — DISCHARGE NOTE PROVIDER - HOSPITAL COURSE
61Fyo is s/p L2-S1 laminectomy/L4-5 fusion on 9/21 for stenosis without any intraoperative complications.  Patient is stable for discharge as per surgeon.  Patient is tolerating Physical Therapy: weight bearing as tolerated, gait training, and no bending, lifting, or twisting.  Leave dressing on for 2 days and then may remove if dressing is gauze and tegaderm, leave on if aquacel. If applicable, staples/sutures to be removed on post op day #14 in surgeon's office.  May take a shower avoiding the direct stream of water directly on the wound/dressing and pat dry.  NO NSAID's (advil, motrin, aleve, aspirin, ibuprofen) until instructed otherwise by surgeon. Pt may continue aspirin 81mg oral daily.  Follow up with Dr. Khoury office in 1week - call the office for appointment [phone]. 62 yo female history of lumbar spinal stenosis s/p L2-S1 laminectomy/L4-5 fusion on 9/21/2021 with Dr. Strauss without any intraoperative complications.  Patient is stable for discharge as per surgeon.  During hospital course, patient had rapid response called for hypotension. Benazepril was held for hypotension. Please follow up with PMD 1 week after hospital discharge for blood pressure check. Patient is tolerating Physical Therapy: weight bearing as tolerated, gait training, and no bending, lifting, or twisting.  Leave dressing on for 2 days and then may remove if dressing is gauze and tegaderm, leave on if aquacel. If applicable, staples/sutures to be removed on post op day #14 in surgeon's office.  May take a shower avoiding the direct stream of water directly on the wound/dressing and pat dry.  NO NSAID's (advil, motrin, aleve, aspirin, ibuprofen) until instructed otherwise by surgeon. Pt may continue aspirin 81mg oral daily.  Follow up with Dr. Strauss in office in 1week - call the office for appointment [phone]. 62 yo female history of lumbar spinal stenosis s/p L2-S1 laminectomy/L4-5 fusion on 9/21/2021 with Dr. Strauss without any intraoperative complications.  Patient is stable for discharge as per surgeon.  During hospital course, patient had rapid response called for hypotension. Benazepril and chlorthiadone were held for hypotension. Please follow up with PMD 1 week after hospital discharge for blood pressure check. Patient is tolerating Physical Therapy: weight bearing as tolerated, gait training, and no bending, lifting, or twisting.  Leave dressing on for 2 days and then may remove if dressing is gauze and tegaderm, leave on if aquacel. If applicable, staples/sutures to be removed on post op day #14 in surgeon's office.  May take a shower avoiding the direct stream of water directly on the wound/dressing and pat dry.  NO NSAID's (advil, motrin, aleve, aspirin, ibuprofen) until instructed otherwise by surgeon. Pt may continue aspirin 81mg oral daily.  Follow up with Dr. Strauss in office in 1week - call the office for appointment [phone].

## 2021-09-27 NOTE — DISCHARGE NOTE PROVIDER - NSDCCPCAREPLAN_GEN_ALL_CORE_FT
PRINCIPAL DISCHARGE DIAGNOSIS  Diagnosis: Spinal stenosis  Assessment and Plan of Treatment:        PRINCIPAL DISCHARGE DIAGNOSIS  Diagnosis: Spinal stenosis  Assessment and Plan of Treatment: 62 yo female history of lumbar spinal stenosis s/p L2-S1 laminectomy/L4-5 fusion on 9/21/2021 with Dr. Strauss without any intraoperative complications.  Patient is stable for discharge as per surgeon.  During hospital course, patient had rapid response called for hypotension. Benazepril was held for hypotension. Please follow up with PMD 1 week after hospital discharge for blood pressure check. Patient is tolerating Physical Therapy: weight bearing as tolerated, gait training, and no bending, lifting, or twisting.  Leave dressing on for 2 days and then may remove if dressing is gauze and tegaderm, leave on if aquacel. If applicable, staples/sutures to be removed on post op day #14 in surgeon's office.  May take a shower avoiding the direct stream of water directly on the wound/dressing and pat dry.  NO NSAID's (advil, motrin, aleve, aspirin, ibuprofen) until instructed otherwise by surgeon. Pt may continue aspirin 81mg oral daily.  Follow up with Dr. Strauss in office in 1week - call the office for appointment [phone].

## 2021-09-27 NOTE — DISCHARGE NOTE PROVIDER - NSDCMRMEDTOKEN_GEN_ALL_CORE_FT
amlodipine-benazepril 10 mg-40 mg oral capsule: 1 cap(s) orally once a day  atorvastatin 40 mg oral tablet: tab(s) orally once a day  busPIRone 5 mg oral tablet: orally once a day (at bedtime)  chlorthalidone 50 mg oral tablet: 1 tab(s) orally once a day  citalopram 40 mg oral tablet: 1 tab(s) orally once a day pm  cyclobenzaprine 10 mg oral tablet: 1 tab(s) orally 3 times a day  Flonase 50 mcg/inh nasal spray: 1 spray(s) nasal once a day  folic acid 1 mg oral tablet: 1 tab(s) orally once a day  lamoTRIgine 200 mg oral tablet, extended release: 1 tab(s) orally once a day  multivitamin: 1 tab(s) orally once a day  Nasal Saline:   omeprazole 40 mg oral delayed release capsule: 1 cap(s) orally once a day  Tylenol Arthritis Caplet 650 mg oral tablet, extended release: 2 tab(s) orally every 8 hours, As Needed  Vitamin C: 1 tab(s) orally once a day   acetaminophen 325 mg oral tablet: 3 tab(s) orally every 8 hours  amlodipine-benazepril 10 mg-40 mg oral capsule: 1 cap(s) orally once a day  aspirin 81 mg oral delayed release tablet: 1 tab(s) orally once a day  atorvastatin 40 mg oral tablet: 1 tab(s) orally once a day (at bedtime)  busPIRone 5 mg oral tablet: orally once a day (at bedtime)  chlorthalidone 50 mg oral tablet: 1 tab(s) orally once a day  citalopram 40 mg oral tablet: 1 tab(s) orally once a day pm  cyclobenzaprine 10 mg oral tablet: 1 tab(s) orally 3 times a day  Flonase 50 mcg/inh nasal spray: 1 spray(s) nasal once a day  folic acid 1 mg oral tablet: 1 tab(s) orally once a day  lamoTRIgine 200 mg oral tablet, extended release: 1 tab(s) orally once a day  multivitamin: 1 tab(s) orally once a day  Nasal Saline:   omeprazole 40 mg oral delayed release capsule: 1 cap(s) orally once a day  oxyCODONE 5 mg oral tablet: 1 tab(s) orally every 3 hours, As needed, Severe Pain (7 - 10)  pantoprazole 40 mg oral delayed release tablet: 1 tab(s) orally once a day (before a meal)  senna oral tablet: 2 tab(s) orally once a day (at bedtime)  Vitamin C: 1 tab(s) orally once a day   acetaminophen 325 mg oral tablet: 3 tab(s) orally every 8 hours  aspirin 81 mg oral delayed release tablet: 1 tab(s) orally once a day  atorvastatin 40 mg oral tablet: 1 tab(s) orally once a day (at bedtime)  busPIRone 5 mg oral tablet: 1 tab(s) orally   chlorthalidone 50 mg oral tablet: 1 tab(s) orally once a day  multivitamin: 1 tab(s) orally once a day  oxyCODONE 5 mg oral tablet: 1 tab(s) orally every 3 hours, As needed, Severe Pain (7 - 10)  pantoprazole 40 mg oral delayed release tablet: 1 tab(s) orally once a day (before a meal)  senna oral tablet: 2 tab(s) orally once a day (at bedtime)   acetaminophen 325 mg oral tablet: 3 tab(s) orally every 8 hours  amLODIPine 10 mg oral tablet: 1 tab(s) orally once a day  aspirin 81 mg oral delayed release tablet: 1 tab(s) orally once a day  atorvastatin 40 mg oral tablet: 1 tab(s) orally once a day (at bedtime)  busPIRone 5 mg oral tablet: 1 tab(s) orally   multivitamin: 1 tab(s) orally once a day  oxyCODONE 5 mg oral tablet: 1 tab(s) orally every 3 hours, As needed, Severe Pain (7 - 10)  pantoprazole 40 mg oral delayed release tablet: 1 tab(s) orally once a day (before a meal)  senna oral tablet: 2 tab(s) orally once a day (at bedtime)

## 2021-09-27 NOTE — DISCHARGE NOTE PROVIDER - NSDCCPTREATMENT_GEN_ALL_CORE_FT
PRINCIPAL PROCEDURE  Procedure: Lumbar fusion  Findings and Treatment: 61Fyo is s/p L2-S1 laminectomy/L4-5 fusion on 9/21 for stenosis without any intraoperative complications.  Patient is stable for discharge as per surgeon.  Patient is tolerating Physical Therapy: weight bearing as tolerated, gait training, and no bending, lifting, or twisting.  Leave dressing on for 2 days and then may remove if dressing is gauze and tegaderm, leave on if aquacel. If applicable, staples/sutures to be removed on post op day #14 in surgeon's office.  May take a shower avoiding the direct stream of water directly on the wound/dressing and pat dry.  NO NSAID's (advil, motrin, aleve, aspirin, ibuprofen) until instructed otherwise by surgeon. Pt may continue aspirin 81mg oral daily.  Follow up with Dr. Khoury office in 1week - call the office for appointment [phone].       PRINCIPAL PROCEDURE  Procedure: Lumbar fusion  Findings and Treatment: See dictated operative note

## 2021-09-27 NOTE — DISCHARGE NOTE PROVIDER - NSDCACTIVITY_GEN_ALL_CORE
Do not drive or operate machinery/Do not make important decisions Do not drive or operate machinery/Showering allowed/Do not make important decisions/Stairs allowed/Walking - Indoors allowed/Walking - Outdoors allowed/Follow Instructions Provided by your Surgical Team

## 2021-09-27 NOTE — DISCHARGE NOTE PROVIDER - CARE PROVIDER_API CALL
Franklyn Strauss)  Orthopaedic Surgery  [address], [address]  [place] [address]  Phone: [phone]  Fax: [phone]  Follow Up Time:

## 2021-09-28 LAB — SARS-COV-2 RNA SPEC QL NAA+PROBE: SIGNIFICANT CHANGE UP

## 2021-09-28 PROCEDURE — 99231 SBSQ HOSP IP/OBS SF/LOW 25: CPT

## 2021-09-28 RX ORDER — CITALOPRAM 10 MG/1
1 TABLET, FILM COATED ORAL
Qty: 0 | Refills: 0 | DISCHARGE

## 2021-09-28 RX ORDER — ACETAMINOPHEN 500 MG
2 TABLET ORAL
Qty: 0 | Refills: 0 | DISCHARGE

## 2021-09-28 RX ORDER — FOLIC ACID 0.8 MG
1 TABLET ORAL
Qty: 0 | Refills: 0 | DISCHARGE

## 2021-09-28 RX ORDER — ACETAMINOPHEN 500 MG
3 TABLET ORAL
Qty: 0 | Refills: 0 | DISCHARGE
Start: 2021-09-28

## 2021-09-28 RX ORDER — ATORVASTATIN CALCIUM 80 MG/1
0 TABLET, FILM COATED ORAL
Qty: 0 | Refills: 0 | DISCHARGE

## 2021-09-28 RX ORDER — SODIUM CHLORIDE 0.65 %
0 AEROSOL, SPRAY (ML) NASAL
Qty: 0 | Refills: 0 | DISCHARGE

## 2021-09-28 RX ORDER — CHLORTHALIDONE 50 MG
1 TABLET ORAL
Qty: 0 | Refills: 0 | DISCHARGE

## 2021-09-28 RX ORDER — SENNA PLUS 8.6 MG/1
2 TABLET ORAL
Qty: 0 | Refills: 0 | DISCHARGE
Start: 2021-09-28

## 2021-09-28 RX ORDER — ASPIRIN/CALCIUM CARB/MAGNESIUM 324 MG
1 TABLET ORAL
Qty: 0 | Refills: 0 | DISCHARGE
Start: 2021-09-28

## 2021-09-28 RX ORDER — OXYCODONE HYDROCHLORIDE 5 MG/1
1 TABLET ORAL
Qty: 0 | Refills: 0 | DISCHARGE
Start: 2021-09-28

## 2021-09-28 RX ORDER — ASCORBIC ACID 60 MG
1 TABLET,CHEWABLE ORAL
Qty: 0 | Refills: 0 | DISCHARGE

## 2021-09-28 RX ORDER — AMLODIPINE BESYLATE AND BENAZEPRIL HYDROCHLORIDE 10; 20 MG/1; MG/1
1 CAPSULE ORAL
Qty: 0 | Refills: 0 | DISCHARGE

## 2021-09-28 RX ORDER — CYCLOBENZAPRINE HYDROCHLORIDE 10 MG/1
1 TABLET, FILM COATED ORAL
Qty: 0 | Refills: 0 | DISCHARGE

## 2021-09-28 RX ORDER — FLUTICASONE PROPIONATE 50 MCG
1 SPRAY, SUSPENSION NASAL
Qty: 0 | Refills: 0 | DISCHARGE

## 2021-09-28 RX ORDER — OMEPRAZOLE 10 MG/1
1 CAPSULE, DELAYED RELEASE ORAL
Qty: 0 | Refills: 0 | DISCHARGE

## 2021-09-28 RX ORDER — ATORVASTATIN CALCIUM 80 MG/1
1 TABLET, FILM COATED ORAL
Qty: 0 | Refills: 0 | DISCHARGE
Start: 2021-09-28

## 2021-09-28 RX ORDER — LAMOTRIGINE 25 MG/1
1 TABLET, ORALLY DISINTEGRATING ORAL
Qty: 0 | Refills: 0 | DISCHARGE

## 2021-09-28 RX ORDER — AMLODIPINE BESYLATE 2.5 MG/1
1 TABLET ORAL
Qty: 0 | Refills: 0 | DISCHARGE
Start: 2021-09-28

## 2021-09-28 RX ORDER — PANTOPRAZOLE SODIUM 20 MG/1
1 TABLET, DELAYED RELEASE ORAL
Qty: 0 | Refills: 0 | DISCHARGE
Start: 2021-09-28

## 2021-09-28 RX ADMIN — Medication 975 MILLIGRAM(S): at 21:39

## 2021-09-28 RX ADMIN — OXYCODONE HYDROCHLORIDE 5 MILLIGRAM(S): 5 TABLET ORAL at 10:02

## 2021-09-28 RX ADMIN — Medication 5 MILLIGRAM(S): at 21:39

## 2021-09-28 RX ADMIN — OXYCODONE HYDROCHLORIDE 5 MILLIGRAM(S): 5 TABLET ORAL at 17:46

## 2021-09-28 RX ADMIN — PANTOPRAZOLE SODIUM 40 MILLIGRAM(S): 20 TABLET, DELAYED RELEASE ORAL at 05:17

## 2021-09-28 RX ADMIN — ATORVASTATIN CALCIUM 40 MILLIGRAM(S): 80 TABLET, FILM COATED ORAL at 21:39

## 2021-09-28 RX ADMIN — AMLODIPINE BESYLATE 10 MILLIGRAM(S): 2.5 TABLET ORAL at 05:17

## 2021-09-28 RX ADMIN — OXYCODONE HYDROCHLORIDE 5 MILLIGRAM(S): 5 TABLET ORAL at 17:02

## 2021-09-28 RX ADMIN — Medication 81 MILLIGRAM(S): at 13:03

## 2021-09-28 RX ADMIN — OXYCODONE HYDROCHLORIDE 5 MILLIGRAM(S): 5 TABLET ORAL at 10:54

## 2021-09-28 RX ADMIN — Medication 975 MILLIGRAM(S): at 02:02

## 2021-09-28 RX ADMIN — Medication 975 MILLIGRAM(S): at 10:02

## 2021-09-29 ENCOUNTER — TRANSCRIPTION ENCOUNTER (OUTPATIENT)
Age: 61
End: 2021-09-29

## 2021-09-29 PROCEDURE — 99231 SBSQ HOSP IP/OBS SF/LOW 25: CPT

## 2021-09-29 RX ADMIN — ATORVASTATIN CALCIUM 40 MILLIGRAM(S): 80 TABLET, FILM COATED ORAL at 23:17

## 2021-09-29 RX ADMIN — AMLODIPINE BESYLATE 10 MILLIGRAM(S): 2.5 TABLET ORAL at 05:08

## 2021-09-29 RX ADMIN — Medication 975 MILLIGRAM(S): at 15:18

## 2021-09-29 RX ADMIN — Medication 975 MILLIGRAM(S): at 23:17

## 2021-09-29 RX ADMIN — OXYCODONE HYDROCHLORIDE 5 MILLIGRAM(S): 5 TABLET ORAL at 12:08

## 2021-09-29 RX ADMIN — OXYCODONE HYDROCHLORIDE 5 MILLIGRAM(S): 5 TABLET ORAL at 05:09

## 2021-09-29 RX ADMIN — Medication 975 MILLIGRAM(S): at 05:09

## 2021-09-29 RX ADMIN — OXYCODONE HYDROCHLORIDE 5 MILLIGRAM(S): 5 TABLET ORAL at 06:09

## 2021-09-29 RX ADMIN — PANTOPRAZOLE SODIUM 40 MILLIGRAM(S): 20 TABLET, DELAYED RELEASE ORAL at 06:59

## 2021-09-29 RX ADMIN — Medication 5 MILLIGRAM(S): at 23:20

## 2021-09-29 RX ADMIN — OXYCODONE HYDROCHLORIDE 5 MILLIGRAM(S): 5 TABLET ORAL at 11:28

## 2021-09-29 RX ADMIN — Medication 975 MILLIGRAM(S): at 14:39

## 2021-09-29 RX ADMIN — Medication 81 MILLIGRAM(S): at 11:27

## 2021-09-29 NOTE — DISCHARGE NOTE NURSING/CASE MANAGEMENT/SOCIAL WORK - PATIENT PORTAL LINK FT
You can access the FollowMyHealth Patient Portal offered by [facility] by registering at the following website: [email or web address]. By joining [email or web address]’s FollowMyHealth portal, you will also be able to view your health information using other applications (apps) compatible with our system.

## 2021-09-29 NOTE — DIETITIAN INITIAL EVALUATION ADULT. - OTHER INFO
Pt has a history of Depression, Bipolar Disorder, CVA w/ residual left sided weakness, HTN, OS and spinal stenosis. Pt is s/p L2-L5 lami and L4-L5 fusion.  Pt states her appetite and po intake have been good. She had no complaints of GI distress except for constipation. Pt is receiving a bowel regimen which is helping.   Pt has no difficulty chewing or swallowing. She has no known food allergies.   Pt made aware that RDN remains available.

## 2021-09-29 NOTE — DISCHARGE NOTE NURSING/CASE MANAGEMENT/SOCIAL WORK - NSTRANSFERBELONGINGSDISPO_GEN_A_NUR
SURGICAL CONSULTATION     DATE OF CONSULT: 8/18/2021     Primary Care Physician: Ynu Santos MD  Requesting Provider: Jesus Mcconnell MD    Patient was seen in the Breast Multidisciplinary Clinic at the [facility]. A preliminary meeting was held between doctors Marcin Combs, Jesus Mcconnell and myself to review the patient's case and discuss the multidisciplinary plan of care. Pathology and all imaging were personally reviewed.      Reason for Request:   Chief Complaint   Patient presents with   • Consultation        HISTORY OF PRESENT ILLNESS: Yessy Griffith is a 62 year old female who was referred to our office by Dr. Mcconnell for a new LEFT breast mass found on screening mammogram that was found to be moderately differentiated invasive ductal carcinoma diagnosed by ultrasound guided needle biopsy.     The patient denies any palpable lumps. She has never had any previous biopsies or masses. Patient denies any  breast pain, skin changes, dimpling, discoloration or nipple drainage. She reached menarche at age 13 and menopause at age 55. She has history of use of birth control or hormone supplements.  The patient has 3 children; delivering her first child at age 18. She denies breastfeeding.  There is no family history of breast or ovarian cancer. Family history includes: father age 70 of pancreatic cancer    Workup included screening mammogram, diagnostic ultrasound and ultrasound guided biopsy, which revealed the above pathology.     Past Medical History:   Diagnosis Date   • Acute respiratory failure with hypoxia (CMS/HCC) 5/7/2020   • Chronic kidney disease    • COVID-19 05/2020   • Essential (primary) hypertension    • Essential hypertension 6/19/2019   • Fatty liver 6/19/2019   • Fibroid    • Hernia of abdominal wall 6/19/2019   • Hyperlipidemia    • Morbid obesity (CMS/HCC)    • PORSHA (obstructive sleep apnea)    • Prediabetes    • Vertigo 7/28/2020        Past Surgical History:   Procedure Laterality Date   •  Carpal tunnel release Left     2 x 2007 and 2010   • Hernia repair  01/29/2020    Robotic Laparoscopic Assisted Ventral Hernia Repair with Mesh by Dr. Pedersen   • Tubal ligation         Current Outpatient Medications   Medication   • hydrochlorothiazide (HYDRODIURIL) 12.5 MG tablet   • cetirizine (ZyrTEC) 10 MG tablet   • fluticasone (FLONASE) 50 MCG/ACT nasal spray   • hydrOXYzine (ATARAX) 25 MG tablet   • nortriptyline (PAMELOR) 25 MG capsule   • meclizine (ANTIVERT) 25 MG tablet   • cholecalciferol (VITAMIN D) 10 mcg (400 units)/mL oral liquid   • Cholecalciferol (vitamin D3) 25 mcg (1,000 units) capsule   • CVS VITAMIN C 500 MG tablet   • Multiple Vitamin (DAILY-DEO) Tab   • acetaminophen (TYLENOL) 325 MG tablet     No current facility-administered medications for this visit.        ALLERGIES:   Allergen Reactions   • Apap-Fd&C Yellow #10 [place]-Hydrocodone Palpitations   • Codeine Palpitations   • Morphine Other (See Comments)     Unknown reaction   • Naproxen CARDIAC DISTURBANCES   • Tylenol With Codeine CARDIAC DISTURBANCES   • Vicodin [Hydrocodone-Acetaminophen] CARDIAC DISTURBANCES       Social History     Tobacco Use   • Smoking status: Never Smoker   • Smokeless tobacco: Never Used   Vaping Use   • Vaping Use: never used   Substance Use Topics   • Alcohol use: No   • Drug use: No          Family History   Problem Relation Age of Onset   • Hypertension Mother    • Kidney disease Mother    • Cancer, Pancreatic Father 70        pancreatic   • Cancer, Esophageal Sister 42        esophageal   • Cancer Sister         multiple myeloma   • Cancer, Colon Sister    • Hypertension Brother    • Stroke Brother    • Patient is unaware of any medical problems Brother    • Stroke Brother    • Asthma Paternal Grandfather    • Diabetes Daughter        REVIEW OF SYSTEMS:   Constitutional:  Denies weight loss, denies sensitivity to hot or cold, denies skin tumors/lumps, Reports sleeping difficulty, denies change in appetite,  Reports high/low blood sugar, denies fevers or night sweats   HEENT:  Denies frequent headaches, denies dentures, denies deafness, denies ear infection, denies sore throat, denies epistaxis, denies sinus discharge, denies motion sickness, Reports eyeglasses, denies neck lumps, denies blurred vision, denies double vision  Cardiorespiratory:  Denies smoking, denies chronic cough, denies SOB (shortness of breath), Reports HTN (hypertension), denies racing heart, Reports ankle swelling, denies varicose veins, denies heart murmur, denies pain in legs with exercise, denies DVT (deep vein thrombosis), denies CHF (congestive heart failure), Reports palpitations   Gastrointestinal:  Denies nausea/vomiting, denies abdominal pain, denies heartburn, denies hemorrhoids, denies hepatitis, denies rectal pain, denies jaundice, denies constipation, denies history of colon polyps, denies melena/hematochezia, denies diarrhea  Genitourinary:  Denies kidney stones, denies kidney/bladder infections, denies dysuria, denies hematuria, denies difficulty urinating, denies urinary frequency  Musculoskeletal:  Denies aching muscles, denies aching joints, Reports back/shoulder pain, denies broken bones  Neurologic:  Denies faintness, denies convulsions, denies numbness, Reports nervousness, denies depression, denies paralysis, denies frequent loss of balance, denies crying spells, denies trouble concentrating    Reproductive:     Females:  Denies bleeding/discharge from nipples, denies history of   breastfeeding, denies breast pain, denies pain/bleeding with intercourse.   Age of menstruation: 13  Age of last period: 55  Skin:  Denies new rashes or lesions, denies black moles, denies nonhealing sores    Allergic/Immunologic:  Denies recurrent infections or hypersensitivity      PHYSICAL EXAM:  Blood pressure (!) 166/77, pulse 93, temperature 99.3 °F (37.4 °C), temperature source Oral, resp. rate 16, weight 131.9 kg, SpO2 96 %. Body mass index  is 46.92 kg/m².  General: Healthy appearing in no apparent distress. Well nourished, well developed, morbidly obese female.    Psychiatric: Patient is awake, alert, and oriented x3, answering questions appropriately and has normal affect.   HEENT: Normocephalic, atraumatic. Sclerae anicteric. Conjunctivae pink and moist. Oral mucosa is pink and moist. Lips, ears and face grossly normal.  Neck: No supraclavicular or cervical lymphadenopathy. No thyromegaly. No palpable neck masses. No JVD (jugular venous distention).  Breasts: Breasts are bilaterally symmetrical.  There is no skin retraction, dimpling or nipple inversion on inspection of either breast. No lymphadenopathy in either axilla.  LEFT breast:  At 3:00-4:00 is a little fullness under biopsy needle site.  There is no skin change 1 no definite abscess.  No nipple drainage.  Axillary lymphadenopathy in either axilla.  Right breast without palpable abnormality.  Extremities:  No clubbing, cyanosis or edema.  Skin: Warm and dry without rash or mass.  Neurologic: Grossly normal cranial nerves, grossly normal sensation.    LABS:   Recent Labs   Lab 08/02/21  1015 01/14/21  0833 09/02/20  0236   Glucose 113* 104* 125*   Sodium 144 142 140   Potassium 3.8 4.1 3.4   Chloride 106 105 104   Carbon Dioxide 29 28 31   BUN 17 13 14   Creatinine 1.09* 1.05* 1.15*   Calcium 9.5 10.1 10.3*   Albumin  --  3.3* 3.6   GOT/AST  --  16 11   Bilirubin, Total  --  0.3 0.3   Alkaline Phosphatase  --  58 71   GPT  --  23 23   Globulin  --  4.6* 4.9*   WBC  --  6.4 6.7   HGB  --  12.0 12.2   MCV  --  86.1 84.4   PLT  --  296 311   Absolute Neutrophils  --  2.7 2.3   Absolute Lymphocytes  --  2.8 3.6   Absolute Monocytes  --  0.6 0.5   Absolute Eosinophils   --  0.2 0.3   Absolute Basophils  --  0.0 0.0     SURGICAL PATHOLOGY:  Collected:  8/9/2021 08:16   Status:  Final result   Visible to patient:  No (scheduled for 8/24/2021 12:23 PM)   Dx:  Other abnormal and inconclusive  findi...   0 Result Notes  Component    Specimen labeled as    Paraffin block from Right breast, 4:00, 8 cm from the nipple   Case of paraffin block number    [id number]   FINAL INTERPRETATION       NON-AMPLIFIED (NEGATIVE)     Comment:  The 2018 ASCO/CAP Clinical Practice Guidelines, stipulate that retesting MAY be of benefit in HER2 non-amplified, grade 3 breast carcinoma cases.   Cell Count Summary    Number of observers:                                                         2  Number of invasive tumor cells counted:                           20  Average number of HER2 signals/nucleus:                       2.2  Average number of CEP17 probe signals/nucleus:           1.8  HER2/CEP17 ratio:                                                            1.22           _____________________________________________________________    Collected:  8/9/2021 08:16 Status:  Edited Result - FINAL   Visible to patient:  No (not released) Dx:  Other abnormal and inconclusive findi...   0 Result Notes  Component  Resulting Agency   Addendum: IHC Prognostic Marker Result   BREAST BIOMARKERS  Block: A1     Estrogen Receptor: POSITIVE         Juan Score 8 (5 + 3); 100% of tumor nuclei,  strong staining        Progesterone Receptor: POSITIVE         Juan Score 5 (3 + 2); 15% of tumor nuclei, moderate staining            HER2 by IHC: Equivocal (2+)    The tissue will be submitted for HER-2 amplification by FISH.    NOTE: Miri Harper MD has reviewed the Her-2 IHC and concurs.     Validated prognostic immunohistochemistry antibodies are interpreted by an [facility] Pathologist with appropriate positive and negative control slides on each tissue section tested. All tissue is fixed in 10% neutral buffered formalin and alcohol fixed cell block slides have been appropriately validated for ER and CT. Ultraview polymer detection is used for ER ([place], SP1 clone), CT ([place], 1E2 clone), and Her-2 ([place] Pathway, 4B5 clone).  Slides are subjected to antigen retrieval for 64 minutes on an automated [place] platform.     Estrogen and Progesterone receptor results are interpreted as:  \"POSITIVE\" when greater than 10% of tumor nuclei stain, \"LOW POSITIVE\" when 1-10% of tumor nuclei stain, and \"NEGATIVE\" when less than 1% or no tumor nuclei stain.     The Juan Score is based upon the % of positive tumor nuclei (0-5) plus the intensity of nuclear staining (0-3):   % of positively staining tumor nuclei - 0%(0), less than 1%(1), 1-9%(2), 10-33%(3), 34-66%(4), and greater than 66%(5);  Intensity of nuclear staining - none (0), weak(1), moderate (2), and strong (3).     HER-2 protein results are interpreted as:  \"POSITIVE\" (3+) when there is circumferential membrane staining that is complete, intense and in greater than 10% of invasive tumor cells.  \"EQUIVOCAL\" (2+) when there is weak to moderate complete membrane staining observed in greater than 10% of tumor cells.  \"NEGATIVE\" (1+) when there is incomplete membrane staining that is faint/barely perceptible and in greater than 10% of tumor cells  \"NEGATIVE\" (0) when there is no staining observed OR membrane staining that is incomplete and is faint/barely perceptible and in less than or equal to 10% of tumor cells     Interpretation guidelines for interpretation of HER-2 in DCIS are not established.     Reference for interpretation guidelines:  Human Epidermal Growth Factor Receptor 2 Testing in Breast Cancer: American Society of Clinical Oncology/College of American Pathologists Clinical Practice Guideline Focused Update. Journal of Clinical Oncology 2018;36:4318-3710.     Case interpreted at [address] [address]  [phone]         Addendum electronically signed by Rios Snyder MD on 8/12/2021 at 3342   Comments:   This is an appended report. These results have been appended to a previously final verified report.       Pathologic Diagnosis                                ** FINAL DIAGNOSIS **     Left breast, 4:00, 8 cm from the nipple, biopsy for mass:   -Invasive moderately differentiated ductal carcinoma with apocrine features (see diagnosis comment).      Electronically signed by Annie Roman MD on 8/11/2021 at 0954   Preliminary result electronically signed by Annie Roman MD on 8/10/2021 at 1030   Comments:   Edited result: Previously reported as [Previous value contains rich text formatting which cannot be displayed here] (see Result History) on 8/10/2021 at 1030 CDT.      Comment  Soumya Rudy   INVASIVE CARCINOMA OF THE BREAST: Biopsy     SITE: Left breast, 4:00, 8 cm from the nipple     INVASIVE CARCINOMA  HISTOLOGIC TYPE: Ductal  HISTOLOGIC GRADE: Moderately-differentiated               Tubular Differentiation:   3/3               Nuclear Pleomorphism:  2/3               Mitotic Rate:                   1/3               Karissa Score:          6/9     GREATEST EXTENT OF INVASIVE CARCINOMA (in any single core): 0.6 cm     LYMPHOVASCULAR INVASION:  Not Identified     DUCTAL CARCINOMA IN SITU: Not Identified     MICROCALCIFICATIONS:  Not Identified     PROGNOSTIC MARKERS:   Ordered on block A1, the results of which will be issued in an addendum     Color ink identified: Black     TOTAL TIME IN FORMALIN:  In compliance with ASCO/CAP guidelines (6 to 72 hours); see gross description for complete details           Gross Description  Matthew RODRIGUEZ  Received in formalin, labeled with the patient's barcode and \"4.6 x 3.1 x 4.1 mm hypoechoic taller than wide solid mass 4:00 position left breast 8 cm from the nipple\"      Radiograph: Not received  Cores of fibrofatty tissue: 5  Length: 0.5 to 1.0 cm  Diameter: 0.3 cm  Inked: Black  Cassettes: 2, entirely submitted     Time tissue obtained: 0816 (08/09/21)  Time in formalin: 0816 (08/09/21)  Formalin end time: 2000 (08/09/21)        Patricio Stroud 08/09/21 5:27 PM          Microscopic Description  Soumya Grant   Sections show black inked tissue cores with an invasive carcinoma with red granular cytoplasm and prominent nucleoli.  Focal ductal formation, moderate nuclear pleomorphism, and low mitotic activity are present.  Angiolymphatic invasion is not identified.  The carcinoma is positive for CK7 and GATA3, confirming breast primary origin.  CK/p63/SMMS shows invasive carcinoma with no in situ carcinoma present.     Chris Zhang MD has reviewed this case and concurs with the interpretation.         IMAGING:   Diagnostic Mammogram/Ultrasound  08/02/2021  CLINICAL INDICATION: Abnormal mammogram left breast.     COMPARISON EXAMS: Bilateral screening mammogram performed on July 28, 2021.  On that study, I described a new asymmetry with possible ill-defined  borders lower outer quadrant left breast B ring. Today's examinations are  performed for further evaluation.     MAMMO DIAGNOSTIC W ESTELLE LEFT     TECHNIQUE: Digital diagnostic mammogram with 2-D and tomosynthesis..      DENSITY: There are scattered areas of fibroglandular density.      MAMMO FINDINGS: The asymmetry with slightly ill-defined borders is  reidentified in the lower outer quadrant approximately the 4:00 position.     US BREAST LIMITED 1-3 QUADRANTS LEFT     ULTRASOUND TECHNIQUE: Ultrasound technologist scanned in the lower outer  quadrant.]      ULTRASOUND FINDINGS: At the 4:00 position sonographically 8 cm from the  nipple is a hypoechoic taller than wide solid mass with ultrasound  dimensions of 4.6 x 3.1 x 4.1 mm corresponding to the mammographic finding.     LEVEL 2 VISIT: I entered the room and reviewed the patient's breast history  with her. She knows of no current breast complaint. With technologist  present and with patient permission, I performed a targeted clinical exam.  I do not palpate a mass. I repeated the ultrasound. I redemonstrate the  presence of the above-mentioned finding at the 4:00  with patient position  sonographically 8 cm from the nipple. I then spoke with the patient has to  the results of her examination. I explained to her the mammographic and  ultrasonic findings are suspicious. I explained to her. The patient  verbalized she understood.        IMPRESSION: Suspicious mammographic and ultrasonic mass lower outer  quadrant (4:00 position sonographically 8 cm from the nipple).     RECOMMENDATIONS: Ultrasound-guided core biopsy. Arrangements will be made  between the patient and the Radiology Nurse to have this procedure  scheduled.     BI-RADS: 4 - Suspicious.  ______________________________________________  BILATERAL SCREENING MAMMOGRAM  07/28/2021  COMPARISON EXAMS: July 22, 2020, July 16, 2019 and July 12, 2018.     CLINICAL INDICATION: Screening.     TECHNIQUE: Bilateral digital screening mammogram with 2D and tomosynthesis.  Computer-Aided Detection was utilized.     DENSITY: There are scattered areas of fibroglandular density.     FINDINGS: New approximate 7 to 8 mm asymmetry with possible ill-defined  borders lower outer quadrant left breast B ring. The rest of each breast  shows no malignant appearing dominant mass or suspicious  microcalcification.        IMPRESSION: Inconclusive imaging of the left breast. Additional left breast  imaging is needed. No evidence for malignancy in the right breast.      RECOMMENDATION: Additional imaging evaluation with diagnostic left  mammogram and left breast ultrasound. We will contact the patient.      BI-RADS Category 0: Incomplete. Need additional imaging evaluation.      IMPRESSION:   Yessy Griffith is a 62 year old female with newly diagnosed LEFT breast moderately differentiated invasive ductal carcinoma, ER / ID positive, Her-2 negative, Clinical Stage 1, T1, N0, M0.     RECOMMENDATION:   What breast cancer is, some of the prognostic features of her disease, etc., were discussed.  NCCN (National Comprehensive Cancer Network) guidelines were covered in  discussing surgical options.  We discussed the patient's pathology in detail.  The patient has what is considered Clinical Stage 1 disease.    We discussed in detail the natural history of breast cancer and the specifics of her case.  We discussed that early stage and the appropriateness of surgical intervention as initial line of therapy.  Given its early stage patient is a good candidate for breast conservation therapy.  This would entail needle localized partial mastectomy with sentinel lymph node biopsy.  We discussed the option of modified radical mastectomy; however, this is thought to be quite aggressive and at this early stage, breast conservation surgery is appropriate.     Thus patient will be scheduled for LEFT breast needle localized partial mastectomy with sentinel lymph node biopsy.  We discussed the potential risks including but not limited to infection, bleeding, positive margins, axillary nerve injury, seromas, lymphedema.  Patient understands and accepts these risks and benefits and is willing to undergo the procedure. Patient understands there is an approximately 10-15% chance of requiring re-excision for margins in the future.   The patient will see her primary care physician, Yun Santos MD in consultation for preoperative history, physical and clearance for surgery.      We also discussed adjuvant radiation and possibly chemotherapy or hormonal therapy which dressed shortly with Dr. Mcconnell and Dr. Combs will see patient shortly.  Given the patient's strong family history of breast cancer, we also recommend genetics testing.       On 8/18/2021, Aleksandra NORIEGA scribed the services personally performed by Dr. Dione Moore  I have reviewed and edited the visit summary above and attest that it is accurate.  Dione Moore MD

## 2021-09-29 NOTE — DISCHARGE NOTE NURSING/CASE MANAGEMENT/SOCIAL WORK - NSDPDISTO_GEN_ALL_CORE
Pt Back incision with dressing intact, positive NV status. VS stable Afebrile. pt lorin po diet, voiding without difficulty./Home Pt Back incision with dressing intact, positive NV status. VS stable Afebrile. pt lorin po diet, voiding without difficulty./Acute rehab

## 2021-09-29 NOTE — DISCHARGE NOTE NURSING/CASE MANAGEMENT/SOCIAL WORK - NSDCPECAREGIVERED_GEN_ALL_CORE
Medline and carenotes for surgical procedure Laminectomy, Spine precautions, incision Care, Oxycodone, pain management, as well as DC Medications and side effects literature for patient reference. Medline and carenotes for surgical procedure Laminectomy, Spine precautions, incision Care, Oxycodone, pain management, as well as DC Medications and side effects literature for patient reference./Yes

## 2021-09-30 VITALS
RESPIRATION RATE: 17 BRPM | HEART RATE: 70 BPM | DIASTOLIC BLOOD PRESSURE: 62 MMHG | OXYGEN SATURATION: 97 % | TEMPERATURE: 98 F | SYSTOLIC BLOOD PRESSURE: 119 MMHG

## 2021-09-30 PROCEDURE — 99232 SBSQ HOSP IP/OBS MODERATE 35: CPT

## 2021-09-30 RX ADMIN — Medication 975 MILLIGRAM(S): at 05:42

## 2021-09-30 RX ADMIN — PANTOPRAZOLE SODIUM 40 MILLIGRAM(S): 20 TABLET, DELAYED RELEASE ORAL at 07:44

## 2021-09-30 RX ADMIN — OXYCODONE HYDROCHLORIDE 5 MILLIGRAM(S): 5 TABLET ORAL at 07:48

## 2021-09-30 RX ADMIN — OXYCODONE HYDROCHLORIDE 5 MILLIGRAM(S): 5 TABLET ORAL at 08:57

## 2021-09-30 RX ADMIN — Medication 81 MILLIGRAM(S): at 12:51

## 2021-09-30 RX ADMIN — AMLODIPINE BESYLATE 10 MILLIGRAM(S): 2.5 TABLET ORAL at 05:42

## 2021-09-30 NOTE — PROGRESS NOTE ADULT - ASSESSMENT
61F hx of depression/bipolar disorder, CVA w/ residual L sided weakness, HTN, OA, spinal stenosis s/p L2-S1 lami, L4-5 fusion. 
61y Female POD4 s/p L2-S1 lami, L4-5 fusion  - Pain control  - FU labs  - WBAT  - PT/OT/OOB  - I/S  - Monitor HMV output  - SCDs  - Dispo planning
61y Female POD5 s/p L2-S1 lami, L4-5 fusion  - Pain control  - FU labs  - WBAT  - PT/OT/OOB  - I/S  - Monitor HMV output  - SCDs  - Dispo planning
61F hx of depression/bipolar disorder, CVA w/ residual L sided weakness, HTN, OA, spinal stenosis s/p L2-S1 lami, L4-5 fusion. 
61F hx of depression/bipolar disorder, CVA w/ residual L sided weakness, HTN, OA, spinal stenosis s/p L2-S1 lami, L4-5 fusion. 
61y Female POD6 s/p L2-S1 lami, L4-5 fusion  - Pain control  - FU labs  - WBAT  - PT/OT/OOB  - I/S  - Monitor HMV output, drains out when patient ambulates more  - SCDs  - Dispo planning: Acute rehab
61y Female POD7 s/p L2-S1 lami, L4-5 fusion  - Pain control  - FU labs  - WBAT  - PT/OT/OOB  - I/S  - Monitor HMV output, drains out when patient ambulates more  - SCDs  - Dispo planning: Acute rehab
61y Female POD8 s/p L2-S1 lami, L4-5 fusion  - Pain control  - FU labs  - WBAT  - PT/OT/OOB  - I/S  - Monitor HMV output, drains out when patient ambulates more  - SCDs  - Dispo planning: Acute rehab
61y Female POD9 s/p L2-S1 lami, L4-5 fusion  - Pain control  - FU labs  - WBAT  - PT/OT/OOB  - I/S  - SCDs  - Dispo planning: Acute rehab
61F hx of depression/bipolar disorder, CVA w/ residual L sided weakness, HTN, OA, spinal stenosis s/p L2-S1 lami, L4-5 fusion. 

## 2021-09-30 NOTE — PROGRESS NOTE ADULT - PROBLEM SELECTOR PROBLEM 3
History of CVA (cerebrovascular accident)

## 2021-09-30 NOTE — PROGRESS NOTE ADULT - PROBLEM SELECTOR PLAN 1
RRT 9/23 for pre-syncopal episode (patient denies LOC) and hypotension while getting up to use the restroom   - Received 1L LR during RRT  - Suspect vasovagal episode   - Negative orthostatics today, were previously positive.   - Continue to hold home ACEi and chlorthalidone for now  - Monitor BP  - Encourage PO intake
s/p L2-S1 lami, L4-5 fusion on 9/21  - Care per primary ortho team   - Multimodal pain control + bowel regimen   - WBAT  - PT/OT/OOB  - Encourage I/S  - Monitor HMV output  - DVT ppx: SCDs.  - Dispo planning to [facility]
RRT 9/23 for pre-syncopal episode (patient denies LOC) and hypotension while getting up to use the restroom   - Received 1L LR during RRT  - Suspect vasovagal episode   - Negative orthostatics today, were previously positive.   - Continue to hold home ACEi and chlorthalidone for now  - Monitor BP  - Encourage PO intake
RRT 9/23 for pre-syncopal episode (patient denies LOC) and hypotension while getting up to use the restroom   - Received 1L LR during RRT  - Suspect vasovagal episode   - Negative orthostatics, were previously positive.   - Continue to hold home ACEi and chlorthalidone for now  - Monitor BP  - Encourage PO intake
RRT 9/23 for pre-syncopal episode (patient denies LOC) and hypotension while getting up to use the restroom   - Received 1L LR during RRT  - Suspect vasovagal episode   - Negative orthostatics, were previously positive.   - Continue to hold home ACEi and chlorthalidone for now  - Monitor BP  - Encourage PO intake
RRT 9/23 for pre-syncopal episode (patient denies LOC) and hypotension while getting up to use the restroom   - Received 1L LR during RRT  - Suspect vasovagal episode   - Negative orthostatics today, were previously positive.   - Continue to hold home ACEi and chlorthalidone for now  - Monitor BP  - Encourage PO intake
RRT 9/23 for pre-syncopal episode (patient denies LOC) and hypotension while getting up to use the restroom   - Received 1L LR during RRT  - Suspect vasovagal episode   - Negative orthostatics today, were previously positive.   - Continue to hold home ACEi and chlorthalidone for now  - Monitor BP  - Encourage PO intake
RRT overnight for pre-syncopal episode (patient denies LOC) and hypotension while getting up to use the restroom   - Received 1L LR during RRT  - Suspect vasovagal episode   - Recommend: check orthostatic vitals, hold home ACEi and chlorthalidone for now  - Monitor BP  - Encourage PO intake   - Pain meds/ flexeril may be contributing to symptoms, will monitor regimen and adjust as needed

## 2021-09-30 NOTE — PROGRESS NOTE ADULT - REASON FOR ADMISSION
L2-S1 laminectomy, L4-5 posterior spinal fusion
s/p Lami
s/p lami/fusion
s/p lami/fusion
s/p Lami

## 2021-09-30 NOTE — PROGRESS NOTE ADULT - PROBLEM SELECTOR PROBLEM 5
MDD (major depressive disorder)

## 2021-09-30 NOTE — PROGRESS NOTE ADULT - PROBLEM SELECTOR PLAN 3
W/ residual L sided weakness  - C/w home statin   - Takes ASA 81mg, on hold for now. Restart once cleared by ortho.
W/ residual L sided weakness  - C/w home statin and ASA 81mg
W/ residual L sided weakness  - C/w home statin   - Takes ASA 81mg, on hold for now. Restart once cleared by ortho.
W/ residual L sided weakness  - C/w home statin and ASA 81mg
W/ residual L sided weakness  - C/w home statin   - Takes ASA 81mg, on hold for now. Restart once cleared by ortho.
W/ residual L sided weakness  - C/w home statin   - Takes ASA 81mg, on hold for now. Restart once cleared by ortho.
W/ residual L sided weakness  - C/w home statin and ASA 81mg
W/ residual L sided weakness  - C/w home statin and ASA 81mg

## 2021-09-30 NOTE — PROGRESS NOTE ADULT - PROBLEM SELECTOR PLAN 6
Hgb baseline 14 --> 10 --> 9.1 post-op  - Likely due to expected blood loss  - No active s/s of bleeding  - Monitor CBC.  - No indication for transfusion at this time

## 2021-09-30 NOTE — PROGRESS NOTE ADULT - PROBLEM SELECTOR PROBLEM 6
Acute postoperative anemia due to expected blood loss

## 2021-09-30 NOTE — PROGRESS NOTE ADULT - PROBLEM SELECTOR PLAN 5
C/w home citalopram, Buspirone and lamotrigine   - Follows with outpatient psych.

## 2021-09-30 NOTE — PROGRESS NOTE ADULT - PROBLEM SELECTOR PLAN 4
Episode of hypotension / pre-syncope overnight   - Will hold home ACEi and chlorthalidone for now   - C/w home amlodipine  - Monitor BP and adjust regimen as needed
Episode of hypotension / pre-syncope   - Continue to home ACEi and chlorthalidone for now for low-normal BPs  - C/w home amlodipine  - Monitor BP and adjust regimen as needed
Episode of hypotension / pre-syncope overnight   - Will hold home ACEi and chlorthalidone for now   - C/w home amlodipine  - Monitor BP and adjust regimen as needed
Episode of hypotension / pre-syncope   - Continue to home ACEi and chlorthalidone for now for low-normal BPs  - C/w home amlodipine  - Monitor BP and adjust regimen as needed

## 2021-09-30 NOTE — PROGRESS NOTE ADULT - PROBLEM SELECTOR PLAN 2
s/p L2-S1 lami, L4-5 fusion on 9/21  - Care per primary ortho team   - Multimodal pain control + bowel regimen   - WBAT  - PT/OT/OOB  - Encourage I/S  - Monitor HMV output  - DVT ppx: SCDs.
s/p L2-S1 lami, L4-5 fusion, POD #3  - Care per primary ortho team   - Multimodal pain control + bowel regimen   - WBAT  - PT/OT/OOB  - Encourage I/S  - Monitor HMV output  - DVT ppx: SCDs.
s/p L2-S1 lami, L4-5 fusion, POD #3  - Care per primary ortho team   - Multimodal pain control + bowel regimen   - WBAT  - PT/OT/OOB  - Encourage I/S  - Monitor HMV output  - DVT ppx: SCDs.
s/p L2-S1 lami, L4-5 fusion on 9/21  - Care per primary ortho team   - Multimodal pain control + bowel regimen   - WBAT  - PT/OT/OOB  - Encourage I/S  - Monitor HMV output  - DVT ppx: SCDs.
s/p L2-S1 lami, L4-5 fusion, POD #3  - Care per primary ortho team   - Multimodal pain control + bowel regimen   - WBAT  - PT/OT/OOB  - Encourage I/S  - Monitor HMV output  - DVT ppx: SCDs.
s/p L2-S1 lami, L4-5 fusion, POD #2  - Care per primary ortho team   - Multimodal pain control + bowel regimen   - WBAT  - PT/OT/OOB  - Encourage I/S  - Monitor HMV output  - DVT ppx: SCDs.
s/p L2-S1 lami, L4-5 fusion on 9/21  - Care per primary ortho team   - Multimodal pain control + bowel regimen   - WBAT  - PT/OT/OOB  - Encourage I/S  - Monitor HMV output  - DVT ppx: SCDs.
RRT 9/23 for pre-syncopal episode (patient denies LOC) and hypotension while getting up to use the restroom   - Received 1L LR during RRT  - Suspect vasovagal episode   - Negative orthostatics, were previously positive.   - Continue to hold home ACEi and chlorthalidone for now  - Monitor BP  - Encourage PO intake

## 2021-09-30 NOTE — PROGRESS NOTE ADULT - PROBLEM SELECTOR PROBLEM 2
Spinal stenosis
Pre-syncope
Spinal stenosis

## 2021-09-30 NOTE — PROGRESS NOTE ADULT - PROVIDER SPECIALTY LIST ADULT
Orthopedics
Pain Medicine
Pain Medicine
Orthopedics
Hospitalist
Orthopedics
Hospitalist

## 2021-09-30 NOTE — PROGRESS NOTE ADULT - SUBJECTIVE AND OBJECTIVE BOX
CHIEF COMPLAINT: f/u     SUBJECTIVE / OVERNIGHT EVENTS: Patient had RRT overnight for pre-syncopal episode and hypotension while getting up to urinate. She was given 1L LR during RRT and improved. This AM, patient states she has had issues with dizziness/similar episodes in the past but is not sure why - last episode about 1 year ago. She denied chest pain, palpitations, SOB prior to episode. States she just felt dizzy, like she was going to collapse and could not keep her eyes open. Does not think she had any LOC. Was caught by the PCAs that were assisting her to bathroom. She currently is endorsing back pain. Denies chest pain, SOB, fevers, chills. Tolerating PO without nausea/vomiting. Per RN, R arm IV infiltrated.     MEDICATIONS  (STANDING):  acetaminophen   Tablet .. 975 milliGRAM(s) Oral every 8 hours  amLODIPine   Tablet 10 milliGRAM(s) Oral daily  atorvastatin 40 milliGRAM(s) Oral at bedtime  busPIRone 5 milliGRAM(s) Oral <User Schedule>  citalopram 40 milliGRAM(s) Oral daily  cyclobenzaprine 5 milliGRAM(s) Oral three times a day  lamoTRIgine 100 milliGRAM(s) Oral two times a day  pantoprazole    Tablet 40 milliGRAM(s) Oral before breakfast  polyethylene glycol 3350 17 Gram(s) Oral daily  senna 2 Tablet(s) Oral at bedtime    MEDICATIONS  (PRN):  benzocaine 15 mG/menthol 3.6 mG (Sugar-Free) Lozenge 1 Lozenge Oral every 3 hours PRN Sore Throat  fluticasone propionate 50 MICROgram(s)/spray Nasal Spray 1 Spray(s) Both Nostrils two times a day PRN congestion/rhinorrhea  magnesium hydroxide Suspension 30 milliLiter(s) Oral every 12 hours PRN Constipation  naloxone Injectable 0.1 milliGRAM(s) IV Push every 3 minutes PRN For ANY of the following changes in patient status:  A. RR LESS THAN 10 breaths per minute, B. Oxygen saturation LESS THAN 90%, C. Sedation score of 6  ondansetron Injectable 4 milliGRAM(s) IV Push every 6 hours PRN Nausea  oxyCODONE    IR 5 milliGRAM(s) Oral every 3 hours PRN Severe Pain (7 - 10)  oxyCODONE    IR 2.5 milliGRAM(s) Oral every 3 hours PRN Moderate Pain (4 - 6)      VITALS:  T(F): 98.1 (09-23-21 @ 10:06), Max: 98.4 (09-22-21 @ 17:19)  HR: 76 (09-23-21 @ 10:06) (63 - 76)  BP: 132/77 (09-23-21 @ 10:06) (97/53 - 134/70)  RR: 17 (09-23-21 @ 10:06) (13 - 18)  SpO2: 98% (09-23-21 @ 10:06)  Wt(kg): --    PHYSICAL EXAM:  GENERAL: NAD, well-groomed, well-developed  ENMT: Moist mucous membranes  RESPIRATORY: Clear to auscultation bilaterally; No rales, rhonchi, wheezing, or rubs  CARDIOVASCULAR: Regular rate and rhythm; No murmurs, rubs, or gallops  GASTROINTESTINAL: Soft, Nontender, Nondistended; Bowel sounds present  GENITOURINARY: Primafit in place   BACK: HV in place   EXTREMITIES:  2+ Peripheral Pulses, No clubbing, cyanosis, or edema  NERVOUS SYSTEM:  Alert & Oriented X3; Moving all 4 extremities; strength 4/5 bilateral LE         LABS:              9.1                  139  | 26   | 16           6.94  >-----------< 149     ------------------------< 110                   28.6                 4.4  | 106  | 0.77                                         Ca 9.0   Mg x     Ph x           TPro  5.5  /  Alb  3.2      TBili  <0.2  /  DBili  x         AST  27  /  ALT  14            AlkPhos  68                RADIOLOGY & ADDITIONAL TESTS:  Imaging Personally Reviewed: [x] Yes    [ ] Consultant(s) Notes Reviewed:  [x] Care Discussed with Consultants/Other Providers: Orthopedic PA - discussed
[facility]  Division of Hospital Medicine  Swathi Kincaid MD  Pager: [phone]      Patient is a 61y old  Female who presents with a chief complaint of s/p lami/fusion (24 Sep 2021 12:29)      SUBJECTIVE / OVERNIGHT EVENTS:  Patient seen and examined. Pain is adequately controlled. No medical concerns   ADDITIONAL REVIEW OF SYSTEMS:    MEDICATIONS  (STANDING):  acetaminophen   Tablet .. 975 milliGRAM(s) Oral every 8 hours  amLODIPine   Tablet 10 milliGRAM(s) Oral daily  aspirin enteric coated 81 milliGRAM(s) Oral daily  atorvastatin 40 milliGRAM(s) Oral at bedtime  busPIRone 5 milliGRAM(s) Oral <User Schedule>  pantoprazole    Tablet 40 milliGRAM(s) Oral before breakfast  polyethylene glycol 3350 17 Gram(s) Oral daily  senna 2 Tablet(s) Oral at bedtime    MEDICATIONS  (PRN):  benzocaine 15 mG/menthol 3.6 mG (Sugar-Free) Lozenge 1 Lozenge Oral every 3 hours PRN Sore Throat  bisacodyl Suppository 10 milliGRAM(s) Rectal daily PRN Constipation  fluticasone propionate 50 MICROgram(s)/spray Nasal Spray 1 Spray(s) Both Nostrils two times a day PRN congestion/rhinorrhea  magnesium hydroxide Suspension 30 milliLiter(s) Oral every 12 hours PRN Constipation  naloxone Injectable 0.1 milliGRAM(s) IV Push every 3 minutes PRN For ANY of the following changes in patient status:  A. RR LESS THAN 10 breaths per minute, B. Oxygen saturation LESS THAN 90%, C. Sedation score of 6  ondansetron Injectable 4 milliGRAM(s) IV Push every 6 hours PRN Nausea  oxyCODONE    IR 5 milliGRAM(s) Oral every 3 hours PRN Severe Pain (7 - 10)  oxyCODONE    IR 2.5 milliGRAM(s) Oral every 3 hours PRN Moderate Pain (4 - 6)      CAPILLARY BLOOD GLUCOSE        I&O's Summary    25 Sep 2021 07:01  -  26 Sep 2021 07:00  --------------------------------------------------------  IN: 0 mL / OUT: 1182.5 mL / NET: -1182.5 mL    26 Sep 2021 07:01  -  26 Sep 2021 14:38  --------------------------------------------------------  IN: 0 mL / OUT: 15 mL / NET: -15 mL        PHYSICAL EXAM:  Vital Signs Last 24 Hrs  T(C): 36.9 (26 Sep 2021 12:22), Max: 37.5 (25 Sep 2021 17:28)  T(F): 98.4 (26 Sep 2021 12:22), Max: 99.5 (25 Sep 2021 17:28)  HR: 80 (26 Sep 2021 12:22) (70 - 80)  BP: 116/69 (26 Sep 2021 12:22) (116/68 - 131/80)  BP(mean): --  RR: 16 (26 Sep 2021 12:22) (16 - 17)  SpO2: 100% (26 Sep 2021 12:22) (96% - 100%)  GENERAL: NAD, well-groomed, well-developed  ENMT: Moist mucous membranes  RESPIRATORY: Clear to auscultation bilaterally; No rales, rhonchi, wheezing, or rubs  CARDIOVASCULAR: Regular rate and rhythm; No murmurs, rubs, or gallops  GASTROINTESTINAL: Soft, Nontender, Nondistended; Bowel sounds present  BACK: HV in place; dressing c/d/i   EXTREMITIES:  2+ Peripheral Pulses, No clubbing, cyanosis, or edema; Strength 2/5 RLE; Strength 3/5 LLE    NERVOUS SYSTEM:  Alert & Oriented X3; Moving all 4 extremities      LABS:                      RADIOLOGY & ADDITIONAL TESTS:  Results Reviewed:   Imaging Personally Reviewed:  Electrocardiogram Personally Reviewed:    COORDINATION OF CARE:  Care Discussed with Consultants/Other Providers [Y/N]:  Prior or Outpatient Records Reviewed [Y/N]:  
Orthopaedic Surgery Progress Note    Subjective:   Patient seen and examined. Ambulated the halls yesterday. No acute events overnight. States pain is controlled.    Objective:  T(C): 36.9 (09-29-21 @ 05:06), Max: 37 (09-28-21 @ 09:45)  HR: 73 (09-29-21 @ 05:06) (71 - 82)  BP: 136/83 (09-29-21 @ 05:06) (106/76 - 136/83)  RR: 17 (09-29-21 @ 05:06) (16 - 17)  SpO2: 100% (09-29-21 @ 05:06) (97% - 100%)  Wt(kg): --    09-27 @ 07:01  -  09-28 @ 07:00  --------------------------------------------------------  IN: 700 mL / OUT: 1652.5 mL / NET: -952.5 mL    09-28 @ 07:01  -  09-29 @ 06:30  --------------------------------------------------------  IN: 1050 mL / OUT: 1100 mL / NET: -50 mL        Physical Exam:    NAD  Back:   dressing C/D/I, mild appropriate saige-incisional ttp  Neuro:  RLE IP 5/5 HS 5/5 Q 5/5 GS 5/5 TA 5/5 EHL 5/5   SILT L2-S1  LLE IP 5/5 HS 5/5 Q 5/5 GS 5/5 TA 5/5 EHL 5/5  SILT L2-S1, decreased R dorsal and plantar foot numbness, at baseline  WWP BLE  
Orthopaedic Surgery Progress Note    Subjective:   Patient seen and examined. No acute events overnight. Pain well controlled    Objective:  T(C): 36.8 (09-28-21 @ 05:00), Max: 37.2 (09-27-21 @ 13:14)  HR: 78 (09-28-21 @ 05:00) (73 - 83)  BP: 130/77 (09-28-21 @ 05:00) (116/68 - 140/80)  RR: 16 (09-28-21 @ 05:00) (14 - 17)  SpO2: 100% (09-28-21 @ 05:00) (96% - 100%)  Wt(kg): --    09-26 @ 07:01  -  09-27 @ 07:00  --------------------------------------------------------  IN: 0 mL / OUT: 832.5 mL / NET: -832.5 mL    09-27 @ 07:01  -  09-28 @ 06:32  --------------------------------------------------------  IN: 700 mL / OUT: 1652.5 mL / NET: -952.5 mL        PE    NAD  Back:   dressing C/D/I, mild appropriate saige-incisional ttp  HMV in place w/ serosanguinous output  Neuro:  RLE IP 5/5 HS 5/5 Q 5/5 GS 5/5 TA 5/5 EHL 5/5   SILT L2-S1  LLE IP 5/5 HS 5/5 Q 5/5 GS 5/5 TA 5/5 EHL 5/5  SILT L2-S1, decreased R dorsal and plantar foot numbness, at baseline  WWP BLE                  
Orthopaedic Surgery Progress Note    Subjective:   Patient seen and examined. No acute events overnight. Patient complaining of surgical site pain, states that her radiculopathy has abated    Objective:  T(C): 36.7 (09-22-21 @ 05:32), Max: 37.1 (09-21-21 @ 21:24)  HR: 75 (09-22-21 @ 05:32) (59 - 84)  BP: 111/75 (09-22-21 @ 05:32) (88/57 - 143/82)  RR: 17 (09-22-21 @ 05:32) (12 - 17)  SpO2: 99% (09-22-21 @ 05:32) (96% - 100%)  Wt(kg): --    09-21 @ 07:01  -  09-22 @ 06:30  --------------------------------------------------------  IN: 410 mL / OUT: 2250 mL / NET: -1840 mL        PE  Gen: NAD  Back:   dressing C/D/I, mild appropriate saige-incisional ttp  HMV in place w/ serosanguinous output  Neuro:  RLE IP 5/5 HS 5/5 Q 5/5 GS 5/5 TA 5/5 EHL 5/5   SILT L2-S1  LLE IP 4/5 HS 4/5 Q 4/5 GS 4/5 TA 4/5 EHL 4/5  SILT L2-S1  WWP BLE                          10.2   7.38  )-----------( 195      ( 21 Sep 2021 14:35 )             31.5     09-21    139  |  106  |  22  ----------------------------<  108<H>  3.8   |  26  |  0.92    Ca    8.9      21 Sep 2021 14:35            61y Female s/p L2-S1 lami, L4-5 fusion, radiculopathy has improved  - Pain control  - FU labs  - WBAT  - PT/OT/OOB  - I/S  - Monitor HMV output  - SCDs  - Dispo planning
Orthopaedic Surgery Progress Note    Subjective:   Patient seen and examined. Patient ambulated to commode yesterday. No acute events overnight. States pain is controlled.    Objective:  Vitals 24hrs  Vital Signs Last 24 Hrs  T(C): 36.8 (27 Sep 2021 06:09), Max: 37.1 (26 Sep 2021 16:36)  T(F): 98.3 (27 Sep 2021 06:09), Max: 98.8 (26 Sep 2021 16:36)  HR: 68 (27 Sep 2021 06:09) (68 - 80)  BP: 133/83 (27 Sep 2021 06:09) (108/68 - 133/83)  BP(mean): --  RR: 16 (27 Sep 2021 06:09) (16 - 16)  SpO2: 98% (27 Sep 2021 06:09) (98% - 100%)      09-25-21 @ 07:01  -  09-26-21 @ 07:00  --------------------------------------------------------  IN: 0 mL / OUT: 1182.5 mL / NET: -1182.5 mL    09-26-21 @ 07:01  -  09-27-21 @ 06:30  --------------------------------------------------------  IN: 0 mL / OUT: 832.5 mL / NET: -832.5 mL        Lab Results 24hrs:          Physical Exam:  Gen: NAD  Back:   dressing C/D/I, mild appropriate saige-incisional ttp  HMV in place w/ serosanguinous output  Neuro:  RLE IP 5/5 HS 5/5 Q 5/5 GS 5/5 TA 5/5 EHL 5/5   SILT L2-S1  LLE IP 5/5 HS 5/5 Q 5/5 GS 5/5 TA 5/5 EHL 4/5  SILT L2-S1, decreased R dorsal and plantar foot numbness, at baseline  WWP BLE
Orthopaedic Surgery Progress Note    Subjective:   Patient seen and examined. Patient had a rapid response called overnight after experiencing a syncopal episode and hypotension while urinating. Denies chest pain, shortness of breath, dizziness this morning    Objective:  T(C): 36.7 (09-23-21 @ 05:37), Max: 36.9 (09-22-21 @ 17:19)  HR: 68 (09-23-21 @ 05:37) (63 - 82)  BP: 134/70 (09-23-21 @ 05:37) (97/53 - 134/70)  RR: 13 (09-23-21 @ 05:37) (13 - 18)  SpO2: 100% (09-23-21 @ 05:37) (97% - 100%)  Wt(kg): --    09-22 @ 07:01  -  09-23 @ 07:00  --------------------------------------------------------  IN: 0 mL / OUT: 1020 mL / NET: -1020 mL        PE  Gen: NAD  Back:   dressing C/D/I, mild appropriate saige-incisional ttp  HMV in place w/ serosanguinous output  Neuro:  RLE IP 5/5 HS 5/5 Q 5/5 GS 5/5 TA 5/5 EHL 5/5   SILT L2-S1  LLE IP 4/5 HS 4/5 Q 4/5 GS 4/5 TA 4/5 EHL 4/5  SILT L2-S1  WWP BLE                            9.1    6.94  )-----------( 149      ( 23 Sep 2021 06:23 )             28.6     09-23    139  |  106  |  16  ----------------------------<  110<H>  4.4   |  26  |  0.77    Ca    9.0      23 Sep 2021 06:23  Mg     1.90     09-23    TPro  5.5<L>  /  Alb  3.2<L>  /  TBili  <0.2  /  DBili  x   /  AST  27  /  ALT  14  /  AlkPhos  68  09-23        61y Female s/p L2-S1 lami, L4-5 fusion,   - Pain control  - FU labs  - WBAT  - PT/OT/OOB  - I/S  - Monitor HMV output  - SCDs  - Dispo planning  
Orthopaedic Surgery Progress Note    Subjective:   Patient seen and examined. Patient had a rapid response called overnight after experiencing a syncopal episode and hypotension while urinating. Denies chest pain, shortness of breath, dizziness this morning    Objective:  Vital Signs Last 24 Hrs  T(C): 36.8 (24 Sep 2021 06:00), Max: 37.4 (23 Sep 2021 22:13)  T(F): 98.2 (24 Sep 2021 06:00), Max: 99.3 (23 Sep 2021 22:13)  HR: 73 (24 Sep 2021 06:00) (72 - 84)  BP: 111/66 (24 Sep 2021 06:00) (104/59 - 132/77)  BP(mean): --  RR: 17 (24 Sep 2021 06:00) (17 - 17)  SpO2: 100% (24 Sep 2021 06:00) (96% - 100%)        PE  Gen: NAD  Back:   dressing C/D/I, mild appropriate saige-incisional ttp  HMV in place w/ serosanguinous output  Neuro:  RLE IP 5/5 HS 5/5 Q 5/5 GS 5/5 TA 5/5 EHL 5/5   SILT L2-S1  LLE IP 4/5 HS 4/5 Q 4/5 GS 4/5 TA 4/5 EHL 4/5  SILT L2-S1  WWP BLE                 CBC Full  -  ( 24 Sep 2021 06:42 )  WBC Count : 7.50 K/uL  RBC Count : 3.08 M/uL  Hemoglobin : 9.1 g/dL  Hematocrit : 28.1 %  Platelet Count - Automated : 167 K/uL  Mean Cell Volume : 91.2 fL  Mean Cell Hemoglobin : 29.5 pg  Mean Cell Hemoglobin Concentration : 32.4 gm/dL  Auto Neutrophil # : 5.32 K/uL  Auto Lymphocyte # : 1.26 K/uL  Auto Monocyte # : 0.75 K/uL  Auto Eosinophil # : 0.11 K/uL  Auto Basophil # : 0.02 K/uL  Auto Neutrophil % : 70.9 %  Auto Lymphocyte % : 16.8 %  Auto Monocyte % : 10.0 %  Auto Eosinophil % : 1.5 %  Auto Basophil % : 0.3 %    09-24    138  |  100  |  12  ----------------------------<  90  3.9   |  27  |  0.69    Ca    9.5      24 Sep 2021 06:42  Mg     1.90     09-23    TPro  5.5<L>  /  Alb  3.2<L>  /  TBili  <0.2  /  DBili  x   /  AST  27  /  ALT  14  /  AlkPhos  68  09-23          61y Female s/p L2-S1 lami, L4-5 fusion,   - Pain control  - FU labs  - WBAT  - PT/OT/OOB  - I/S  - Monitor HMV output  - SCDs  - Dispo planning  
Orthopedics Post-Op Check:  Patient was seen and examined at bedside. Denies CP/SOB/Dizziness, N/V/D, HA. States pain is controlled with no radiation to extremities at this time.    Vital Signs Last 24 Hrs  T(C): 36.7 (21 Sep 2021 17:17), Max: 36.7 (21 Sep 2021 06:43)  T(F): 98 (21 Sep 2021 17:17), Max: 98.1 (21 Sep 2021 14:10)  HR: 64 (21 Sep 2021 17:17) (59 - 84)  BP: 115/74 (21 Sep 2021 17:17) (88/57 - 143/82)  BP(mean): 76 (21 Sep 2021 16:45) (65 - 98)  RR: 15 (21 Sep 2021 17:17) (12 - 15)  SpO2: 100% (21 Sep 2021 17:17) (96% - 100%)    GEN: NAD  Back: Dressing C/D/I. HV (sewn) in place  B/L LE: Motor intact 4/5 EHL/FHL/TA/GS in the b/l LE. Sensation is grossly intact in the bilateral distal extremities. Extremities are warm, compartments are soft, DP 1+ b/l LE.     Labs:                          10.2   7.38  )-----------( 195      ( 21 Sep 2021 14:35 )             31.5       09-21    139  |  106  |  22  ----------------------------<  108<H>  3.8   |  26  |  0.92    Ca    8.9      21 Sep 2021 14:35        A/P: Patient is a 61y y/o Female s/p L2-S1 laminectomy, L4-5 posterior spinal fusion, POD #0  -Pain control/analgesia  -Antibiotics - Ancef postop  -Incentive spirometry  -Venodynes  -F/U AM Labs  -PT/OT/WBAT  -Nixon - monitor output  -Monitor HV output  -Notify orthopedics with any questions  
Anesthesia Pain Management Service    SUBJECTIVE: Patient states the IV PCA helps her with her pain but the relief does ot last long enough. Patient states she takes Flexeril 20mg daily at home.   Pain Scale Score	At rest: __4/10_ 	With Activity: ___ 	[X ] Refer to charted pain scores    THERAPY:    [ ] IV PCA Morphine		[ ] 5 mg/mL	[ ] 1 mg/mL  [X ] IV PCA Hydromorphone	[ ] 5 mg/mL	[X ] 1 mg/mL  [ ] IV PCA Fentanyl		[ ] 50 micrograms/mL    Demand dose __0.2_ lockout __6_ (minutes) Continuous Rate _0__ Total: __6.8_   mg used (in past 24 hrs)      MEDICATIONS  (STANDING):  acetaminophen   Tablet .. 975 milliGRAM(s) Oral every 8 hours  amLODIPine   Tablet 10 milliGRAM(s) Oral daily  atorvastatin 40 milliGRAM(s) Oral at bedtime  busPIRone 5 milliGRAM(s) Oral <User Schedule>  citalopram 40 milliGRAM(s) Oral daily  cyclobenzaprine 10 milliGRAM(s) Oral three times a day  hydrochlorothiazide 25 milliGRAM(s) Oral daily  lamoTRIgine 100 milliGRAM(s) Oral two times a day  lisinopril 40 milliGRAM(s) Oral daily  pantoprazole    Tablet 40 milliGRAM(s) Oral before breakfast  polyethylene glycol 3350 17 Gram(s) Oral daily  senna 2 Tablet(s) Oral at bedtime  sodium chloride 0.9%. 1000 milliLiter(s) (125 mL/Hr) IV Continuous <Continuous>    MEDICATIONS  (PRN):  benzocaine 15 mG/menthol 3.6 mG (Sugar-Free) Lozenge 1 Lozenge Oral every 3 hours PRN Sore Throat  fluticasone propionate 50 MICROgram(s)/spray Nasal Spray 1 Spray(s) Both Nostrils two times a day PRN congestion/rhinorrhea  HYDROmorphone  Injectable 0.5 milliGRAM(s) IV Push every 3 hours PRN Severe Breakthrough  Pain (7 - 10)  magnesium hydroxide Suspension 30 milliLiter(s) Oral every 12 hours PRN Constipation  naloxone Injectable 0.1 milliGRAM(s) IV Push every 3 minutes PRN For ANY of the following changes in patient status:  A. RR LESS THAN 10 breaths per minute, B. Oxygen saturation LESS THAN 90%, C. Sedation score of 6  ondansetron Injectable 4 milliGRAM(s) IV Push every 6 hours PRN Nausea  oxyCODONE    IR 5 milliGRAM(s) Oral every 3 hours PRN Moderate Pain (4 - 6)  oxyCODONE    IR 10 milliGRAM(s) Oral every 3 hours PRN Severe Pain (7 - 10)      OBJECTIVE: Patient sitting up in bed.    Sedation Score:	[ X] Alert	[ ] Drowsy 	[ ] Arousable	[ ] Asleep	[ ] Unresponsive    Side Effects:	[X ] None	[ ] Nausea	[ ] Vomiting	[ ] Pruritus  		[ ] Other:    Vital Signs Last 24 Hrs  T(C): 36.7 (22 Sep 2021 09:35), Max: 37.1 (21 Sep 2021 21:24)  T(F): 98.1 (22 Sep 2021 09:35), Max: 98.7 (21 Sep 2021 21:24)  HR: 82 (22 Sep 2021 09:35) (59 - 84)  BP: 112/73 (22 Sep 2021 09:35) (88/57 - 143/82)  BP(mean): 76 (21 Sep 2021 16:45) (65 - 98)  RR: 18 (22 Sep 2021 09:35) (12 - 18)  SpO2: 100% (22 Sep 2021 09:35) (97% - 100%)    ASSESSMENT/ PLAN    Therapy to  be:	[ ] Continue   [ X] Discontinued   [X ] Change to prn Analgesics    Documentation and Verification of current medications:   [X] Done	[ ] Not done, not elligible    Comments: Discussed patient with primary team. IV PCA discontinued PRN Oral/IV opioids and/or Adjuvant non-opioid medication to be ordered at this point.    Progress Note written now but Patient was seen earlier.
CHIEF COMPLAINT: f/u     SUBJECTIVE / OVERNIGHT EVENTS: Patient seen and examined. No acute events overnight. States that she is having pain today in her back. She denies CP/SOB. Denies lightheadedness/dizziness or further pre-syncope episodes. Feels like her R leg has been more weak after surgery, normally her L leg is weak. Denies change in sensation. Ate breakfast without issues.     MEDICATIONS  (STANDING):  acetaminophen   Tablet .. 975 milliGRAM(s) Oral every 8 hours  amLODIPine   Tablet 10 milliGRAM(s) Oral daily  aspirin enteric coated 81 milliGRAM(s) Oral daily  atorvastatin 40 milliGRAM(s) Oral at bedtime  busPIRone 5 milliGRAM(s) Oral <User Schedule>  citalopram 40 milliGRAM(s) Oral daily  lamoTRIgine 100 milliGRAM(s) Oral two times a day  pantoprazole    Tablet 40 milliGRAM(s) Oral before breakfast  polyethylene glycol 3350 17 Gram(s) Oral daily  senna 2 Tablet(s) Oral at bedtime    MEDICATIONS  (PRN):  benzocaine 15 mG/menthol 3.6 mG (Sugar-Free) Lozenge 1 Lozenge Oral every 3 hours PRN Sore Throat  fluticasone propionate 50 MICROgram(s)/spray Nasal Spray 1 Spray(s) Both Nostrils two times a day PRN congestion/rhinorrhea  magnesium hydroxide Suspension 30 milliLiter(s) Oral every 12 hours PRN Constipation  naloxone Injectable 0.1 milliGRAM(s) IV Push every 3 minutes PRN For ANY of the following changes in patient status:  A. RR LESS THAN 10 breaths per minute, B. Oxygen saturation LESS THAN 90%, C. Sedation score of 6  ondansetron Injectable 4 milliGRAM(s) IV Push every 6 hours PRN Nausea  oxyCODONE    IR 5 milliGRAM(s) Oral every 3 hours PRN Severe Pain (7 - 10)  oxyCODONE    IR 2.5 milliGRAM(s) Oral every 3 hours PRN Moderate Pain (4 - 6)      VITALS:  T(F): 98.6 (09-24-21 @ 08:44), Max: 99.3 (09-23-21 @ 22:13)  HR: 73 (09-24-21 @ 10:03) (72 - 84)  BP: 115/63 (09-24-21 @ 10:03) (104/59 - 124/75)  RR: 17 (09-24-21 @ 08:44) (17 - 17)  SpO2: 100% (09-24-21 @ 10:03)  Wt(kg): --    PHYSICAL EXAM:  GENERAL: NAD, well-groomed, well-developed  ENMT: Moist mucous membranes  RESPIRATORY: Clear to auscultation bilaterally; No rales, rhonchi, wheezing, or rubs  CARDIOVASCULAR: Regular rate and rhythm; No murmurs, rubs, or gallops  GASTROINTESTINAL: Soft, Nontender, Nondistended; Bowel sounds present  BACK: HV in place; dressing c/d/i   EXTREMITIES:  2+ Peripheral Pulses, No clubbing, cyanosis, or edema; Strength 2/5 RLE; Strength 3/5 LLE    NERVOUS SYSTEM:  Alert & Oriented X3; Moving all 4 extremities    LABS:              9.1                  138  | 27   | 12           7.50  >-----------< 167     ------------------------< 90                    28.1                 3.9  | 100  | 0.69                                         Ca 9.5   Mg x     Ph x           TPro  5.5  /  Alb  3.2      TBili  <0.2  /  DBili  x         AST  27  /  ALT  14            AlkPhos  68                RADIOLOGY & ADDITIONAL TESTS:  Imaging Personally Reviewed: [x] Yes    [ ] Consultant(s) Notes Reviewed:  [x] Care Discussed with Consultants/Other Providers: Orthopedic PA - discussed
Day __2_ of Anesthesia Pain Management Service    SUBJECTIVE:    Therapy:	  [x ] IV PCA	   [ ] Epidural           [ ] s/p Spinal Opoid              [ ] Postpartum infusion	  [ ] Patient controlled regional anesthesia (PCRA)    [ ] prn Analgesics    OBJECTIVE:   [x ] No new signs     [ ] Other:    Side Effects:  [x ] None			[ ] Other:    Assessment of Catheter Site:		[ ] Intact		[ ] Other:    ASSESSMENT/PLAN  [ ] Continue current therapy    [ x] Therapy changed to:    [ ] IV PCA       [ ] Epidural     [ x] prn Analgesics     Comments:
Orthopaedic Surgery Progress Note    Subjective:   Patient seen and examined. Ambulated the halls twice yesterday. No acute events overnight. States pain is controlled. Denies fever/chills/chest pain/shortness of breath/numbness/tingling.    Objective:  T(C): 36.9 (09-30-21 @ 05:39), Max: 37.2 (09-29-21 @ 12:31)  HR: 72 (09-30-21 @ 05:39) (71 - 82)  BP: 128/81 (09-30-21 @ 05:39) (110/76 - 130/83)  RR: 17 (09-30-21 @ 05:39) (16 - 17)  SpO2: 97% (09-30-21 @ 05:39) (97% - 100%)  Wt(kg): --    09-28 @ 07:01  -  09-29 @ 07:00  --------------------------------------------------------  IN: 1050 mL / OUT: 1100 mL / NET: -50 mL    09-29 @ 07:01  -  09-30 @ 06:55  --------------------------------------------------------  IN: 0 mL / OUT: 600 mL / NET: -600 mL        Physical Exam:    NAD  Back:   dressing C/D/I, mild appropriate saige-incisional ttp  Neuro:  RLE IP 5/5 HS 5/5 Q 5/5 GS 5/5 TA 5/5 EHL 5/5   SILT L2-S1  LLE IP 5/5 HS 5/5 Q 5/5 GS 5/5 TA 5/5 EHL 5/5  SILT L2-S1, decreased R dorsal and plantar foot numbness, at baseline  WWP BLE  
Orthopaedic Surgery Progress Note    Subjective:   Patient seen and examined. Did not ambulate yesterday due to weakness however she is motivated to make progress. No acute events overnight. States pain is controlled.    Objective:  T(C): 37 (09-25-21 @ 08:45), Max: 37.2 (09-24-21 @ 13:54)  HR: 80 (09-25-21 @ 08:45) (63 - 80)  BP: 106/61 (09-25-21 @ 08:45) (106/61 - 115/63)  RR: 16 (09-25-21 @ 08:45) (16 - 18)  SpO2: 98% (09-25-21 @ 08:45) (97% - 100%)  Wt(kg): --    09-24 @ 07:01  -  09-25 @ 07:00  --------------------------------------------------------  IN: 325 mL / OUT: 1200 mL / NET: -875 mL        Physical Exam:    Gen: NAD  Back:   dressing C/D/I, mild appropriate saige-incisional ttp  HMV in place w/ serosanguinous output  Neuro:  RLE IP 5/5 HS 5/5 Q 5/5 GS 5/5 TA 5/5 EHL 5/5   SILT L2-S1  LLE IP 4/5 HS 4/5 Q 4/5 GS 4/5 TA 4/5 EHL 4/5  SILT L2-S1  WWP BLE                          9.1    7.50  )-----------( 167      ( 24 Sep 2021 06:42 )             28.1     09-24    138  |  100  |  12  ----------------------------<  90  3.9   |  27  |  0.69    Ca    9.5      24 Sep 2021 06:42    
Orthopaedic Surgery Progress Note    Subjective:   Patient seen and examined. Patient ambulated 20 feet x2 with PT yesterday. No acute events overnight. States pain is controlled.    Objective:  T(C): 37 (09-26-21 @ 05:22), Max: 37.5 (09-25-21 @ 17:28)  HR: 70 (09-26-21 @ 05:22) (70 - 80)  BP: 124/67 (09-26-21 @ 05:22) (106/61 - 131/80)  RR: 16 (09-26-21 @ 05:22) (16 - 17)  SpO2: 97% (09-26-21 @ 05:22) (96% - 100%)  Wt(kg): --    09-25 @ 07:01  -  09-26 @ 07:00  --------------------------------------------------------  IN: 0 mL / OUT: 1182.5 mL / NET: -1182.5 mL        Physical Exam:  Gen: NAD  Back:   dressing C/D/I, mild appropriate saige-incisional ttp  HMV in place w/ serosanguinous output  Neuro:  RLE IP 5/5 HS 5/5 Q 5/5 GS 5/5 TA 5/5 EHL 5/5   SILT L2-S1  LLE IP 4/5 HS 4/5 Q 4/5 GS 4/5 TA 4/5 EHL 4/5  SILT L2-S1  WWP BLE
CHIEF COMPLAINT: f/u     SUBJECTIVE / OVERNIGHT EVENTS: Patient seen and examined. No acute events overnight. Still with some back pain but it is controlled with pain medications. Still with some dizziness when she changes positions but no feelings of pre-syncope. Feels better when changes positions slowly. Denies chest pain, palpitations, SOB. Still feels LE weakness, no different than usual. Eating and drinking well. Having regular BMs.     MEDICATIONS  (STANDING):  acetaminophen   Tablet .. 975 milliGRAM(s) Oral every 8 hours  amLODIPine   Tablet 10 milliGRAM(s) Oral daily  aspirin enteric coated 81 milliGRAM(s) Oral daily  atorvastatin 40 milliGRAM(s) Oral at bedtime  busPIRone 5 milliGRAM(s) Oral <User Schedule>  pantoprazole    Tablet 40 milliGRAM(s) Oral before breakfast  polyethylene glycol 3350 17 Gram(s) Oral daily  senna 2 Tablet(s) Oral at bedtime    MEDICATIONS  (PRN):  benzocaine 15 mG/menthol 3.6 mG (Sugar-Free) Lozenge 1 Lozenge Oral every 3 hours PRN Sore Throat  bisacodyl Suppository 10 milliGRAM(s) Rectal daily PRN Constipation  fluticasone propionate 50 MICROgram(s)/spray Nasal Spray 1 Spray(s) Both Nostrils two times a day PRN congestion/rhinorrhea  magnesium hydroxide Suspension 30 milliLiter(s) Oral every 12 hours PRN Constipation  naloxone Injectable 0.1 milliGRAM(s) IV Push every 3 minutes PRN For ANY of the following changes in patient status:  A. RR LESS THAN 10 breaths per minute, B. Oxygen saturation LESS THAN 90%, C. Sedation score of 6  ondansetron Injectable 4 milliGRAM(s) IV Push every 6 hours PRN Nausea  oxyCODONE    IR 5 milliGRAM(s) Oral every 3 hours PRN Severe Pain (7 - 10)  oxyCODONE    IR 2.5 milliGRAM(s) Oral every 3 hours PRN Moderate Pain (4 - 6)      VITALS:  T(F): 98.4 (09-27-21 @ 10:01), Max: 98.8 (09-26-21 @ 16:36)  HR: 73 (09-27-21 @ 10:01) (68 - 80)  BP: 124/68 (09-27-21 @ 10:01) (108/68 - 133/83)  RR: 17 (09-27-21 @ 10:01) (16 - 17)  SpO2: 97% (09-27-21 @ 10:01)  Wt(kg): --      PHYSICAL EXAM:  GENERAL: NAD, well-groomed, well-developed  ENMT: Moist mucous membranes  RESPIRATORY: Clear to auscultation bilaterally; No rales, rhonchi, wheezing, or rubs  CARDIOVASCULAR: Regular rate and rhythm; No murmurs, rubs, or gallops  GASTROINTESTINAL: Soft, Nontender, Nondistended; Bowel sounds present  BACK: HV in place; dressing c/d/i   EXTREMITIES:  2+ Peripheral Pulses, No clubbing, cyanosis, or edema; Strength 3/5 RLE; Strength 2/5 LLE    NERVOUS SYSTEM:  Alert & Oriented X3; Moving all 4 extremities    LABS:    [ ] Consultant(s) Notes Reviewed:  [x] Care Discussed with Consultants/Other Providers: Orthopedic PA - discussed
CHIEF COMPLAINT: f/u     SUBJECTIVE / OVERNIGHT EVENTS: Patient seen and examined. No acute events overnight. Still with some back pain but it is controlled with pain medications. Having BMs. Dizziness controlled and able to manage with careful movements. No new concerns today.     MEDICATIONS  (STANDING):  acetaminophen   Tablet .. 975 milliGRAM(s) Oral every 8 hours  amLODIPine   Tablet 10 milliGRAM(s) Oral daily  aspirin enteric coated 81 milliGRAM(s) Oral daily  atorvastatin 40 milliGRAM(s) Oral at bedtime  busPIRone 5 milliGRAM(s) Oral <User Schedule>  pantoprazole    Tablet 40 milliGRAM(s) Oral before breakfast  polyethylene glycol 3350 17 Gram(s) Oral daily  senna 2 Tablet(s) Oral at bedtime    MEDICATIONS  (PRN):  benzocaine 15 mG/menthol 3.6 mG (Sugar-Free) Lozenge 1 Lozenge Oral every 3 hours PRN Sore Throat  bisacodyl Suppository 10 milliGRAM(s) Rectal daily PRN Constipation  fluticasone propionate 50 MICROgram(s)/spray Nasal Spray 1 Spray(s) Both Nostrils two times a day PRN congestion/rhinorrhea  magnesium hydroxide Suspension 30 milliLiter(s) Oral every 12 hours PRN Constipation  naloxone Injectable 0.1 milliGRAM(s) IV Push every 3 minutes PRN For ANY of the following changes in patient status:  A. RR LESS THAN 10 breaths per minute, B. Oxygen saturation LESS THAN 90%, C. Sedation score of 6  ondansetron Injectable 4 milliGRAM(s) IV Push every 6 hours PRN Nausea  oxyCODONE    IR 5 milliGRAM(s) Oral every 3 hours PRN Severe Pain (7 - 10)  oxyCODONE    IR 2.5 milliGRAM(s) Oral every 3 hours PRN Moderate Pain (4 - 6)    Vital Signs Last 24 Hrs  T(C): 36.8 (28 Sep 2021 05:00), Max: 37.2 (27 Sep 2021 13:14)  T(F): 98.2 (28 Sep 2021 05:00), Max: 99 (27 Sep 2021 13:14)  HR: 78 (28 Sep 2021 05:00) (73 - 83)  BP: 130/77 (28 Sep 2021 05:00) (116/68 - 140/80)  BP(mean): --  RR: 16 (28 Sep 2021 05:00) (14 - 16)  SpO2: 100% (28 Sep 2021 05:00) (96% - 100%)    PHYSICAL EXAM:  GENERAL: NAD, well-groomed, well-developed  ENMT: Moist mucous membranes  RESPIRATORY: Clear to auscultation bilaterally; No rales, rhonchi, wheezing, or rubs  CARDIOVASCULAR: Regular rate and rhythm; No murmurs, rubs, or gallops  GASTROINTESTINAL: Soft, Nontender, Nondistended; Bowel sounds present  BACK: HV in place; dressing c/d/i   EXTREMITIES:  2+ Peripheral Pulses, No clubbing, cyanosis, or edema; Strength 3/5 RLE; Strength 2/5 LLE    NERVOUS SYSTEM:  Alert & Oriented X3; Moving all 4 extremities    LABS:    [ ] Consultant(s) Notes Reviewed:  [x] Care Discussed with Consultants/Other Providers: Orthopedic PA - discussed
Not applicable as gestational age is greater than or equal to 34 weeks.
CHIEF COMPLAINT: f/u     SUBJECTIVE / OVERNIGHT EVENTS: Patient seen and examined. No acute events overnight. Patient states that pain is mostly well controlled with Tylenol but occassionally needs to take stronger pain meds. No further episodes of dizziness. Feels well, ready to go to rehab.     MEDICATIONS  (STANDING):  acetaminophen   Tablet .. 975 milliGRAM(s) Oral every 8 hours  amLODIPine   Tablet 10 milliGRAM(s) Oral daily  aspirin enteric coated 81 milliGRAM(s) Oral daily  atorvastatin 40 milliGRAM(s) Oral at bedtime  busPIRone 5 milliGRAM(s) Oral <User Schedule>  pantoprazole    Tablet 40 milliGRAM(s) Oral before breakfast  polyethylene glycol 3350 17 Gram(s) Oral daily  senna 2 Tablet(s) Oral at bedtime    MEDICATIONS  (PRN):  benzocaine 15 mG/menthol 3.6 mG (Sugar-Free) Lozenge 1 Lozenge Oral every 3 hours PRN Sore Throat  bisacodyl Suppository 10 milliGRAM(s) Rectal daily PRN Constipation  fluticasone propionate 50 MICROgram(s)/spray Nasal Spray 1 Spray(s) Both Nostrils two times a day PRN congestion/rhinorrhea  magnesium hydroxide Suspension 30 milliLiter(s) Oral every 12 hours PRN Constipation  naloxone Injectable 0.1 milliGRAM(s) IV Push every 3 minutes PRN For ANY of the following changes in patient status:  A. RR LESS THAN 10 breaths per minute, B. Oxygen saturation LESS THAN 90%, C. Sedation score of 6  ondansetron Injectable 4 milliGRAM(s) IV Push every 6 hours PRN Nausea  oxyCODONE    IR 5 milliGRAM(s) Oral every 3 hours PRN Severe Pain (7 - 10)  oxyCODONE    IR 2.5 milliGRAM(s) Oral every 3 hours PRN Moderate Pain (4 - 6)      VITALS:  T(F): 98.6 (09-30-21 @ 09:35), Max: 98.6 (09-30-21 @ 01:46)  HR: 73 (09-30-21 @ 09:35) (71 - 75)  BP: 114/80 (09-30-21 @ 09:35) (114/80 - 130/79)  RR: 17 (09-30-21 @ 09:35) (17 - 17)  SpO2: 97% (09-30-21 @ 09:35)  Wt(kg): --    PHYSICAL EXAM:  GENERAL: NAD, well-groomed, well-developed  ENMT: Moist mucous membranes  RESPIRATORY: Clear to auscultation bilaterally; No rales, rhonchi, wheezing, or rubs  CARDIOVASCULAR: Regular rate and rhythm; No murmurs, rubs, or gallops  GASTROINTESTINAL: Soft, Nontender, Nondistended; Bowel sounds present  EXTREMITIES:  2+ Peripheral Pulses, No clubbing, cyanosis, or edema; Strength 4/5 RLE; Strength 3/5 LLE    NERVOUS SYSTEM:  Alert & Oriented X3; Moving all 4 extremities      [ ] Consultant(s) Notes Reviewed:  [x] Care Discussed with Consultants/Other Providers: Orthopedic PA - discussed
CHIEF COMPLAINT: f/u     SUBJECTIVE / OVERNIGHT EVENTS: Patient seen and examined. No acute events overnight. Sitting in chair. Worked with PT this morning. Tolerating diet and eating well.  Awaiting rehab placement.     MEDICATIONS  (STANDING):  acetaminophen   Tablet .. 975 milliGRAM(s) Oral every 8 hours  amLODIPine   Tablet 10 milliGRAM(s) Oral daily  aspirin enteric coated 81 milliGRAM(s) Oral daily  atorvastatin 40 milliGRAM(s) Oral at bedtime  busPIRone 5 milliGRAM(s) Oral <User Schedule>  pantoprazole    Tablet 40 milliGRAM(s) Oral before breakfast  polyethylene glycol 3350 17 Gram(s) Oral daily  senna 2 Tablet(s) Oral at bedtime    MEDICATIONS  (PRN):  benzocaine 15 mG/menthol 3.6 mG (Sugar-Free) Lozenge 1 Lozenge Oral every 3 hours PRN Sore Throat  bisacodyl Suppository 10 milliGRAM(s) Rectal daily PRN Constipation  fluticasone propionate 50 MICROgram(s)/spray Nasal Spray 1 Spray(s) Both Nostrils two times a day PRN congestion/rhinorrhea  magnesium hydroxide Suspension 30 milliLiter(s) Oral every 12 hours PRN Constipation  naloxone Injectable 0.1 milliGRAM(s) IV Push every 3 minutes PRN For ANY of the following changes in patient status:  A. RR LESS THAN 10 breaths per minute, B. Oxygen saturation LESS THAN 90%, C. Sedation score of 6  ondansetron Injectable 4 milliGRAM(s) IV Push every 6 hours PRN Nausea  oxyCODONE    IR 5 milliGRAM(s) Oral every 3 hours PRN Severe Pain (7 - 10)  oxyCODONE    IR 2.5 milliGRAM(s) Oral every 3 hours PRN Moderate Pain (4 - 6)    Vital Signs Last 24 Hrs  T(C): 36.5 (29 Sep 2021 09:09), Max: 36.9 (28 Sep 2021 21:36)  T(F): 97.7 (29 Sep 2021 09:09), Max: 98.4 (28 Sep 2021 21:36)  HR: 82 (29 Sep 2021 09:09) (71 - 82)  BP: 130/83 (29 Sep 2021 09:09) (106/76 - 136/83)  BP(mean): --  RR: 16 (29 Sep 2021 09:09) (16 - 17)  SpO2: 100% (29 Sep 2021 09:09) (97% - 100%)    PHYSICAL EXAM:  GENERAL: NAD, well-groomed, well-developed  ENMT: Moist mucous membranes  RESPIRATORY: Clear to auscultation bilaterally; No rales, rhonchi, wheezing, or rubs  CARDIOVASCULAR: Regular rate and rhythm; No murmurs, rubs, or gallops  GASTROINTESTINAL: Soft, Nontender, Nondistended; Bowel sounds present  BACK: HV in place; dressing c/d/i   EXTREMITIES:  2+ Peripheral Pulses, No clubbing, cyanosis, or edema; Strength 3/5 RLE; Strength 2/5 LLE    NERVOUS SYSTEM:  Alert & Oriented X3; Moving all 4 extremities    LABS:    [ ] Consultant(s) Notes Reviewed:  [x] Care Discussed with Consultants/Other Providers: Orthopedic PA - discussed
[facility]  Division of Hospital Medicine  Swathi Kincaid MD  Pager: [phone]      Patient is a 61y old  Female who presents with a chief complaint of s/p lami/fusion (24 Sep 2021 12:29)      SUBJECTIVE / OVERNIGHT EVENTS: Patient seen and examined. Pain is adequately controlled.   ADDITIONAL REVIEW OF SYSTEMS:    MEDICATIONS  (STANDING):  acetaminophen   Tablet .. 975 milliGRAM(s) Oral every 8 hours  amLODIPine   Tablet 10 milliGRAM(s) Oral daily  aspirin enteric coated 81 milliGRAM(s) Oral daily  atorvastatin 40 milliGRAM(s) Oral at bedtime  busPIRone 5 milliGRAM(s) Oral <User Schedule>  pantoprazole    Tablet 40 milliGRAM(s) Oral before breakfast  polyethylene glycol 3350 17 Gram(s) Oral daily  senna 2 Tablet(s) Oral at bedtime    MEDICATIONS  (PRN):  benzocaine 15 mG/menthol 3.6 mG (Sugar-Free) Lozenge 1 Lozenge Oral every 3 hours PRN Sore Throat  fluticasone propionate 50 MICROgram(s)/spray Nasal Spray 1 Spray(s) Both Nostrils two times a day PRN congestion/rhinorrhea  magnesium hydroxide Suspension 30 milliLiter(s) Oral every 12 hours PRN Constipation  naloxone Injectable 0.1 milliGRAM(s) IV Push every 3 minutes PRN For ANY of the following changes in patient status:  A. RR LESS THAN 10 breaths per minute, B. Oxygen saturation LESS THAN 90%, C. Sedation score of 6  ondansetron Injectable 4 milliGRAM(s) IV Push every 6 hours PRN Nausea  oxyCODONE    IR 5 milliGRAM(s) Oral every 3 hours PRN Severe Pain (7 - 10)  oxyCODONE    IR 2.5 milliGRAM(s) Oral every 3 hours PRN Moderate Pain (4 - 6)      CAPILLARY BLOOD GLUCOSE        I&O's Summary    24 Sep 2021 07:01  -  25 Sep 2021 07:00  --------------------------------------------------------  IN: 325 mL / OUT: 1200 mL / NET: -875 mL    25 Sep 2021 07:01  -  25 Sep 2021 15:39  --------------------------------------------------------  IN: 0 mL / OUT: 0 mL / NET: 0 mL        PHYSICAL EXAM:  Vital Signs Last 24 Hrs  T(C): 36.9 (25 Sep 2021 11:59), Max: 37 (24 Sep 2021 17:56)  T(F): 98.5 (25 Sep 2021 11:59), Max: 98.6 (24 Sep 2021 17:56)  HR: 73 (25 Sep 2021 11:59) (69 - 80)  BP: 125/71 (25 Sep 2021 11:59) (106/61 - 125/71)  BP(mean): --  RR: 16 (25 Sep 2021 11:59) (16 - 18)  SpO2: 99% (25 Sep 2021 11:59) (97% - 100%)  GENERAL: NAD, well-groomed, well-developed  ENMT: Moist mucous membranes  RESPIRATORY: Clear to auscultation bilaterally; No rales, rhonchi, wheezing, or rubs  CARDIOVASCULAR: Regular rate and rhythm; No murmurs, rubs, or gallops  GASTROINTESTINAL: Soft, Nontender, Nondistended; Bowel sounds present  BACK: HV in place; dressing c/d/i   EXTREMITIES:  2+ Peripheral Pulses, No clubbing, cyanosis, or edema; Strength 2/5 RLE; Strength 3/5 LLE    NERVOUS SYSTEM:  Alert & Oriented X3; Moving all 4 extremities      LABS:                        9.1    7.50  )-----------( 167      ( 24 Sep 2021 06:42 )             28.1     09-24    138  |  100  |  12  ----------------------------<  90  3.9   |  27  |  0.69    Ca    9.5      24 Sep 2021 06:42                  RADIOLOGY & ADDITIONAL TESTS:  Results Reviewed:   Imaging Personally Reviewed:  Electrocardiogram Personally Reviewed:    COORDINATION OF CARE:  Care Discussed with Consultants/Other Providers [Y/N]:  Prior or Outpatient Records Reviewed [Y/N]:

## 2021-10-01 ENCOUNTER — APPOINTMENT (OUTPATIENT)
Dept: ORTHOPEDIC SURGERY | Facility: CLINIC | Age: 61
End: 2021-10-01

## 2021-10-01 NOTE — CHART NOTE - NSCHARTNOTEFT_GEN_A_CORE
Patient with [facility] to [facility] yesterday [phone]  On chart review, Buspar remained on d/c med rec  Spoke to RN on unit 2n Maj[facility]  RN made aware as per CL recommendations, patient was advised to hold buspar and follow up with psychiatry at SNF/outpatient  RN noted to hold and will speak with patient provider when assigned.

## 2021-11-05 ENCOUNTER — NON-APPOINTMENT (OUTPATIENT)
Age: 61
End: 2021-11-05

## 2021-11-29 RX ORDER — CYCLOBENZAPRINE HYDROCHLORIDE 10 MG/1
10 TABLET, FILM COATED ORAL 3 TIMES DAILY
Qty: 30 | Refills: 0 | Status: ACTIVE | COMMUNITY
Start: 2021-11-29 | End: 1900-01-01

## 2021-11-29 RX ORDER — OXYCODONE 5 MG/1
5 TABLET ORAL EVERY 6 HOURS
Qty: 40 | Refills: 0 | Status: ACTIVE | COMMUNITY
Start: 2021-11-29 | End: 1900-01-01

## 2021-12-03 ENCOUNTER — APPOINTMENT (OUTPATIENT)
Dept: ORTHOPEDIC SURGERY | Facility: CLINIC | Age: 61
End: 2021-12-03
Payer: MEDICARE

## 2021-12-03 VITALS — TEMPERATURE: 96.9 F

## 2021-12-03 PROCEDURE — 72100 X-RAY EXAM L-S SPINE 2/3 VWS: CPT

## 2021-12-03 PROCEDURE — 99024 POSTOP FOLLOW-UP VISIT: CPT

## 2021-12-03 RX ORDER — METHYLPREDNISOLONE 4 MG/1
4 TABLET ORAL
Qty: 1 | Refills: 0 | Status: ACTIVE | COMMUNITY
Start: 2021-12-03 | End: 1900-01-01

## 2021-12-03 NOTE — PHYSICAL EXAM
[Walker] : ambulates with walker [de-identified] : Her incision is well-healed.  Stable neurologic exam. [de-identified] : AP lateral lumbar x-rays reveals instrumented L4-5 fusion

## 2021-12-03 NOTE — HISTORY OF PRESENT ILLNESS
[de-identified] : Ms. ALCIDES RAMÍREZ  is a 61 year old female who presents s/p L2-S1 laminectomy and L4-5 fusion om 9/21/21.  Overall, she is doing well.  She has some back pain.  She is taking oxycodone and flexeril for symptom control.

## 2021-12-03 NOTE — DISCUSSION/SUMMARY
[de-identified] : We discussed further treatment options.  Overall she is improved.  Significant improvement of back pain.  She has had some slight recurrence of radiculopathy.  She will try Medrol Dosepak and some additional physical therapy.  Follow-up in 4 to 6 weeks time or sooner with any changes or worsening of her symptoms.

## 2022-02-04 ENCOUNTER — APPOINTMENT (OUTPATIENT)
Dept: ORTHOPEDIC SURGERY | Facility: CLINIC | Age: 62
End: 2022-02-04

## 2022-03-04 RX ORDER — OXYCODONE 5 MG/1
5 TABLET ORAL EVERY 6 HOURS
Qty: 30 | Refills: 0 | Status: ACTIVE | COMMUNITY
Start: 2022-03-04 | End: 1900-01-01

## 2022-03-04 RX ORDER — CYCLOBENZAPRINE HYDROCHLORIDE 10 MG/1
10 TABLET, FILM COATED ORAL 3 TIMES DAILY
Qty: 30 | Refills: 0 | Status: ACTIVE | COMMUNITY
Start: 2022-03-04 | End: 1900-01-01

## 2022-03-18 ENCOUNTER — APPOINTMENT (OUTPATIENT)
Dept: ORTHOPEDIC SURGERY | Facility: CLINIC | Age: 62
End: 2022-03-18
Payer: MEDICARE

## 2022-03-18 VITALS — TEMPERATURE: 91.9 F

## 2022-03-18 DIAGNOSIS — M48.07 SPINAL STENOSIS, LUMBOSACRAL REGION: ICD-10-CM

## 2022-03-18 DIAGNOSIS — M43.16 SPONDYLOLISTHESIS, LUMBAR REGION: ICD-10-CM

## 2022-03-18 PROCEDURE — 99214 OFFICE O/P EST MOD 30 MIN: CPT

## 2022-03-18 PROCEDURE — 72100 X-RAY EXAM L-S SPINE 2/3 VWS: CPT

## 2022-03-18 RX ORDER — CYCLOBENZAPRINE HYDROCHLORIDE 10 MG/1
10 TABLET, FILM COATED ORAL 3 TIMES DAILY
Qty: 30 | Refills: 0 | Status: ACTIVE | COMMUNITY
Start: 2021-09-03 | End: 1900-01-01

## 2022-03-18 NOTE — DISCUSSION/SUMMARY
[de-identified] : Overall, she is recovered well.  She feels improvements in her back and leg pain.  She will undergo some physical therapy as she was not able to do so before.  Follow-up in 2 to 3 months time or sooner with any changes or worsening of her symptoms.

## 2022-03-18 NOTE — PHYSICAL EXAM
[Walker] : ambulates with walker [de-identified] : Her incision is well-healed.  Stable neurologic exam. [de-identified] : AP lateral lumbar x-rays reveals instrumented L4-5 fusion

## 2022-03-18 NOTE — HISTORY OF PRESENT ILLNESS
[de-identified] : Ms. ALCIDES RAMÍREZ  is a 61 year old female who presents s/p L2-S1 laminectomy and L4-5 fusion om 9/21/21.  Overall, she is doing well.  She has some back pain.  She is taking oxycodone and flexeril for symptom control.

## 2022-06-14 NOTE — PROGRESS NOTE ADULT - PROBLEM/PLAN-1
DISPLAY PLAN FREE TEXT
Acitretin Counseling:  I discussed with the patient the risks of acitretin including but not limited to hair loss, dry lips/skin/eyes, liver damage, hyperlipidemia, depression/suicidal ideation, photosensitivity.  Serious rare side effects can include but are not limited to pancreatitis, pseudotumor cerebri, bony changes, clot formation/stroke/heart attack.  Patient understands that alcohol is contraindicated since it can result in liver toxicity and significantly prolong the elimination of the drug by many years.

## 2022-07-21 ENCOUNTER — APPOINTMENT (OUTPATIENT)
Dept: GASTROENTEROLOGY | Facility: CLINIC | Age: 62
End: 2022-07-21

## 2022-10-20 ENCOUNTER — APPOINTMENT (OUTPATIENT)
Dept: NEUROLOGY | Facility: CLINIC | Age: 62
End: 2022-10-20

## 2022-10-20 VITALS
OXYGEN SATURATION: 99 % | HEIGHT: 64 IN | BODY MASS INDEX: 28.17 KG/M2 | HEART RATE: 85 BPM | WEIGHT: 165 LBS | SYSTOLIC BLOOD PRESSURE: 201 MMHG | TEMPERATURE: 98.2 F | DIASTOLIC BLOOD PRESSURE: 111 MMHG

## 2022-10-20 VITALS — SYSTOLIC BLOOD PRESSURE: 187 MMHG | DIASTOLIC BLOOD PRESSURE: 109 MMHG | HEART RATE: 74 BPM

## 2022-10-20 DIAGNOSIS — G43.009 MIGRAINE W/OUT AURA, NOT INTRACTABLE, W/OUT STATUS MIGRAINOSUS: ICD-10-CM

## 2022-10-20 DIAGNOSIS — I10 ESSENTIAL (PRIMARY) HYPERTENSION: ICD-10-CM

## 2022-10-20 DIAGNOSIS — R51.9 HEADACHE, UNSPECIFIED: ICD-10-CM

## 2022-10-20 DIAGNOSIS — M54.16 RADICULOPATHY, LUMBAR REGION: ICD-10-CM

## 2022-10-20 PROCEDURE — 99205 OFFICE O/P NEW HI 60 MIN: CPT

## 2022-10-20 RX ORDER — TOPIRAMATE 25 MG/1
25 TABLET, FILM COATED ORAL
Qty: 60 | Refills: 5 | Status: ACTIVE | COMMUNITY
Start: 2022-10-20 | End: 1900-01-01

## 2022-10-20 RX ORDER — AMLODIPINE BESYLATE AND BENAZEPRIL HYDROCHLORIDE 10; 40 MG/1; MG/1
10-40 CAPSULE ORAL
Qty: 90 | Refills: 0 | Status: ACTIVE | COMMUNITY
Start: 2022-01-14

## 2022-10-20 NOTE — ASSESSMENT
[FreeTextEntry1] : Migraines without an aura, in a patient with prior history of migraines, who is also complaining of visual loss and joint pain and proximal weakness, concerning for migraines versus temporal arteritis, clinically, there is a secondary theology of headaches.  Will obtain MRI of the brain to evaluate for secondary causes of headaches.  Will start Topamax 25 mg and titrate to 25 mg twice a day.  Will also check ESR and CRP.  Neurological examination is notable for TA pulses intact, NT, no proximal weakness, no jaw claudication which is inconsistent with temporal arteritis, however there is clinical suspicion based on patient's history.\par \par The patient did not take her blood pressure medications today, she will do so when she goes home.  She declines going to the emergency room and will follow up with her PMD.\par \par I spent the time noted on the day of this patient encounter preparing for, providing and documenting the above E/M service and counseling and educate patient on differential, workup, disease course, and treatment/management. Education was provided to the patient during this encounter. All questions and concerns were answered and addressed in detail. The patient verbalized understanding and agreed to plan. Patient was advised to continue to monitor for neurologic symptoms and to notify my office or go to the nearest emergency room if there are any changes. Any orders/referrals and communications were provided as well. \par Side effects of the above medications were discussed in detail including but not limited to applicable black box warning and teratogenicity as appropriate. \par Patient was advised to bring previous records to my office, including CD of imaging, when applicable. \par \par

## 2022-10-20 NOTE — HISTORY OF PRESENT ILLNESS
[FreeTextEntry1] : The patient is a 62year old female who s/p L2-S1 laminectomy and L4-5 fusion om 9/21/21 here for headaches which started in 2020 1/2022.  The headaches are described as mostly right-sided throbbing pain associated with photophobia and phonophobia no nausea vomiting.  No aura.  She has had prior headaches like this when she was younger.  Her headaches occur 4 times per week and last for hours.  The patient takes over-the-counter medication with minimal relief.\par \par Additionally, the patient said that she has visual loss, as well as joint pain and proximal weakness.\par

## 2022-10-20 NOTE — CONSULT LETTER
[Dear  ___] : Dear  [unfilled], [Consult Letter:] : I had the pleasure of evaluating your patient, [unfilled]. [Please see my note below.] : Please see my note below. [Consult Closing:] : Thank you very much for allowing me to participate in the care of this patient.  If you have any questions, please do not hesitate to contact me. [Sincerely,] : Sincerely, [FreeTextEntry3] : Marifer Longoria MD, MPH\par

## 2022-10-20 NOTE — PHYSICAL EXAM
[General Appearance - Alert] : alert [General Appearance - In No Acute Distress] : in no acute distress [Person] : oriented to person [Place] : oriented to place [Time] : oriented to time [Concentration Intact] : normal concentrating ability [Naming Objects] : no difficulty naming common objects [Comprehension] : comprehension intact [Vocabulary] : adequate range of vocabulary [Cranial Nerves Optic (II)] : visual acuity intact bilaterally,  visual fields full to confrontation, pupils equal round and reactive to light [Cranial Nerves Oculomotor (III)] : extraocular motion intact [Cranial Nerves Trigeminal (V)] : facial sensation intact symmetrically [Cranial Nerves Facial (VII)] : face symmetrical [Cranial Nerves Vestibulocochlear (VIII)] : hearing was intact bilaterally [Cranial Nerves Glossopharyngeal (IX)] : tongue and palate midline [Cranial Nerves Accessory (XI - Cranial And Spinal)] : head turning and shoulder shrug symmetric [Cranial Nerves Hypoglossal (XII)] : there was no tongue deviation with protrusion [Sensation Tactile Decrease] : light touch was intact [Coordination - Dysmetria Impaired Finger-to-Nose Bilateral] : not present [Coordination - Dysmetria Impaired Heel-to-Shin Bilateral] : not present [FreeTextEntry5] : TA pulses intact, NT, no proximal weakness, no jaw claudication [FreeTextEntry8] : Antalgic

## 2022-10-25 LAB — CRP SERPL-MCNC: 4 MG/L

## 2023-01-19 NOTE — DISCHARGE NOTE NURSING/CASE MANAGEMENT/SOCIAL WORK - NSDCPEPT PROEDMA_GEN_ALL_CORE
Yes

## 2023-02-01 ENCOUNTER — APPOINTMENT (OUTPATIENT)
Dept: ORTHOPEDIC SURGERY | Facility: CLINIC | Age: 63
End: 2023-02-01

## 2023-05-17 ENCOUNTER — APPOINTMENT (OUTPATIENT)
Dept: NEUROLOGY | Facility: CLINIC | Age: 63
End: 2023-05-17

## 2024-01-03 ENCOUNTER — NON-APPOINTMENT (OUTPATIENT)
Age: 64
End: 2024-01-03

## 2024-01-10 NOTE — CONSULT LETTER
[Dear  ___] : Dear  [unfilled], [Consult Letter:] : I had the pleasure of evaluating your patient, [unfilled]. [Consult Closing:] : Thank you very much for allowing me to participate in the care of this patient.  If you have any questions, please do not hesitate to contact me. [Sincerely,] : Sincerely, [FreeTextEntry3] : Carlitos Crenshaw MD

## 2024-01-10 NOTE — DATA REVIEWED
[FreeTextEntry1] : Patient: ALCIDES RAMÍREZ YOB: 1960 Phone: [phone] MRN: [id number] Acc: [id number] Date of Exam: 12-   EXAM:  MRI PELVIS WITHOUT AND WITH CONTRAST HISTORY:  Pelvic pain. Other specified noninflammatory disorders of the uterus. Pelvic mass.  TECHNIQUE:  A multiplanar multiecho pelvic MRI examination was performed. Axial, coronal, and sagittal  T2 weighted imaging, small field-of-view T2 weighted imaging, axial in and out of phase T1, axial T1 and fat saturated sequences are performed utilizing a 1.5T magnet. After intravenous administration of gadolinium contrast material, postcontrast sequences are obtained in axial and sagittal plane. 16 ml of Clariscan was injected from a 20 ml single use vial. Diffusion weighted imaging with multiple values and ADC map imaging is also provided. Subtraction views are provided.   COMPARISON:  Pelvic ultrasound 11/21/2022.  FINDINGS:   Patient appears to be status post supracervical hysterectomy. Cervical remnant demonstrates a small nabothian cyst.  Suspected left ovary measures 1.8 x 1 cm.  Right ovary not visualized.  No evident pathologic pelvic adenopathy.  There is a moderate in size right direct inguinal hernia containing a moderate in length segment of small bowel without definite obstruction. This appears to contain small bowel and possibly large bowel as well. The fascial defect measures 3 cm.  There is there is a moderate in size left direct inguinal hernia containing fat and a small amount of fluid. The defect measures 2 cm.  Bladder unremarkable.  No evident pelvic free fluid.  Partially imaged left hip arthroplasty. Partially imaged spinal hardware. There appears to be some edema and peripheral enhancement along the right greater trochanter, possibly bursitis. 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  Surgical material along the abdominal wall.  IMPRESSION:  1. Bowel containing moderate-sized right inguinal hernia. Recommend surgical evaluation. 2. Moderate fat-containing left inguinal hernia, also containing a small amount of fluid. 3. 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 Recommend CT follow-up in 6-12 months or as otherwise directed by vascular. 4. Status post supracervical hysterectomy. 5. Enhancing edema/small amount of fluid along the right greater trochanter, possibly bursitis.

## 2024-01-11 ENCOUNTER — APPOINTMENT (OUTPATIENT)
Dept: SURGERY | Facility: CLINIC | Age: 64
End: 2024-01-11
Payer: MEDICARE

## 2024-01-11 VITALS
HEART RATE: 56 BPM | WEIGHT: 167 LBS | BODY MASS INDEX: 28.51 KG/M2 | SYSTOLIC BLOOD PRESSURE: 194 MMHG | DIASTOLIC BLOOD PRESSURE: 127 MMHG | HEIGHT: 64 IN

## 2024-01-11 DIAGNOSIS — I63.9 CEREBRAL INFARCTION, UNSPECIFIED: ICD-10-CM

## 2024-01-11 DIAGNOSIS — K40.90 UNILATERAL INGUINAL HERNIA, W/OUT OBSTRUCTION OR GANGRENE, NOT SPECIFIED AS RECURRENT: ICD-10-CM

## 2024-01-11 PROCEDURE — 99203 OFFICE O/P NEW LOW 30 MIN: CPT

## 2024-01-11 NOTE — PHYSICAL EXAM
[No Rash or Lesion] : No rash or lesion [Alert] : alert [Oriented to Person] : oriented to person [Oriented to Place] : oriented to place [Oriented to Time] : oriented to time [Calm] : calm

## 2024-01-24 ENCOUNTER — OUTPATIENT (OUTPATIENT)
Dept: OUTPATIENT SERVICES | Facility: HOSPITAL | Age: 64
LOS: 1 days | End: 2024-01-24
Payer: MEDICARE

## 2024-01-24 VITALS
RESPIRATION RATE: 18 BRPM | HEIGHT: 66 IN | OXYGEN SATURATION: 98 % | DIASTOLIC BLOOD PRESSURE: 116 MMHG | HEART RATE: 69 BPM | TEMPERATURE: 97 F | WEIGHT: 177.03 LBS | SYSTOLIC BLOOD PRESSURE: 185 MMHG

## 2024-01-24 DIAGNOSIS — M21.611 BUNION OF RIGHT FOOT: Chronic | ICD-10-CM

## 2024-01-24 DIAGNOSIS — Z01.818 ENCOUNTER FOR OTHER PREPROCEDURAL EXAMINATION: ICD-10-CM

## 2024-01-24 DIAGNOSIS — Z96.642 PRESENCE OF LEFT ARTIFICIAL HIP JOINT: Chronic | ICD-10-CM

## 2024-01-24 DIAGNOSIS — Z98.890 OTHER SPECIFIED POSTPROCEDURAL STATES: Chronic | ICD-10-CM

## 2024-01-24 DIAGNOSIS — Z90.710 ACQUIRED ABSENCE OF BOTH CERVIX AND UTERUS: Chronic | ICD-10-CM

## 2024-01-24 DIAGNOSIS — K40.90 UNILATERAL INGUINAL HERNIA, WITHOUT OBSTRUCTION OR GANGRENE, NOT SPECIFIED AS RECURRENT: ICD-10-CM

## 2024-01-24 DIAGNOSIS — E78.5 HYPERLIPIDEMIA, UNSPECIFIED: ICD-10-CM

## 2024-01-24 DIAGNOSIS — Z98.84 BARIATRIC SURGERY STATUS: Chronic | ICD-10-CM

## 2024-01-24 DIAGNOSIS — K21.9 GASTRO-ESOPHAGEAL REFLUX DISEASE WITHOUT ESOPHAGITIS: ICD-10-CM

## 2024-01-24 DIAGNOSIS — Z98.89 OTHER SPECIFIED POSTPROCEDURAL STATES: Chronic | ICD-10-CM

## 2024-01-24 DIAGNOSIS — T84.093A OTHER MECHANICAL COMPLICATION OF INTERNAL LEFT KNEE PROSTHESIS, INITIAL ENCOUNTER: Chronic | ICD-10-CM

## 2024-01-24 DIAGNOSIS — Z98.51 TUBAL LIGATION STATUS: Chronic | ICD-10-CM

## 2024-01-24 DIAGNOSIS — Z96.60 PRESENCE OF UNSPECIFIED ORTHOPEDIC JOINT IMPLANT: Chronic | ICD-10-CM

## 2024-01-24 DIAGNOSIS — F41.9 ANXIETY DISORDER, UNSPECIFIED: ICD-10-CM

## 2024-01-24 DIAGNOSIS — I10 ESSENTIAL (PRIMARY) HYPERTENSION: ICD-10-CM

## 2024-01-24 DIAGNOSIS — Z90.711 ACQUIRED ABSENCE OF UTERUS WITH REMAINING CERVICAL STUMP: Chronic | ICD-10-CM

## 2024-01-24 LAB
ANION GAP SERPL CALC-SCNC: 3 MMOL/L — LOW (ref 5–17)
APTT BLD: 46.5 SEC — HIGH (ref 24.5–35.6)
BUN SERPL-MCNC: 17 MG/DL — SIGNIFICANT CHANGE UP (ref 7–18)
CALCIUM SERPL-MCNC: 9.5 MG/DL — SIGNIFICANT CHANGE UP (ref 8.4–10.5)
CHLORIDE SERPL-SCNC: 109 MMOL/L — HIGH (ref 96–108)
CO2 SERPL-SCNC: 28 MMOL/L — SIGNIFICANT CHANGE UP (ref 22–31)
CREAT SERPL-MCNC: 0.78 MG/DL — SIGNIFICANT CHANGE UP (ref 0.5–1.3)
EGFR: 85 ML/MIN/1.73M2 — SIGNIFICANT CHANGE UP
GLUCOSE SERPL-MCNC: 82 MG/DL — SIGNIFICANT CHANGE UP (ref 70–99)
HCT VFR BLD CALC: 38.8 % — SIGNIFICANT CHANGE UP (ref 34.5–45)
HGB BLD-MCNC: 12 G/DL — SIGNIFICANT CHANGE UP (ref 11.5–15.5)
MCHC RBC-ENTMCNC: 25.4 PG — LOW (ref 27–34)
MCHC RBC-ENTMCNC: 30.9 GM/DL — LOW (ref 32–36)
MCV RBC AUTO: 82.2 FL — SIGNIFICANT CHANGE UP (ref 80–100)
NRBC # BLD: 0 /100 WBCS — SIGNIFICANT CHANGE UP (ref 0–0)
PLATELET # BLD AUTO: 240 K/UL — SIGNIFICANT CHANGE UP (ref 150–400)
POTASSIUM SERPL-MCNC: 4 MMOL/L — SIGNIFICANT CHANGE UP (ref 3.5–5.3)
POTASSIUM SERPL-SCNC: 4 MMOL/L — SIGNIFICANT CHANGE UP (ref 3.5–5.3)
RBC # BLD: 4.72 M/UL — SIGNIFICANT CHANGE UP (ref 3.8–5.2)
RBC # FLD: 17.8 % — HIGH (ref 10.3–14.5)
SODIUM SERPL-SCNC: 140 MMOL/L — SIGNIFICANT CHANGE UP (ref 135–145)
WBC # BLD: 6.27 K/UL — SIGNIFICANT CHANGE UP (ref 3.8–10.5)
WBC # FLD AUTO: 6.27 K/UL — SIGNIFICANT CHANGE UP (ref 3.8–10.5)

## 2024-01-24 RX ORDER — SODIUM CHLORIDE 9 MG/ML
3 INJECTION INTRAMUSCULAR; INTRAVENOUS; SUBCUTANEOUS EVERY 8 HOURS
Refills: 0 | Status: DISCONTINUED | OUTPATIENT
Start: 2024-01-30 | End: 2024-02-13

## 2024-01-24 NOTE — H&P PST ADULT - PROBLEM SELECTOR PLAN 2
Patient instructed to continue Lisinopril as prescribed and take the morning of surgery with just a sips of water.  BP not well controlled. instructed to see a PCP for further mngmnt.

## 2024-01-24 NOTE — H&P PST ADULT - FUNCTIONAL STATUS
unable to walk >1 block,/less than 4 METS uses walker, unable to walk >1 block due to back pain leg weakness/less than 4 METS

## 2024-01-24 NOTE — H&P PST ADULT - PROBLEM SELECTOR PLAN 1
Preop instructions provided including npo status, Hibiclens wash for infection control and GI prophylaxis. Pt to c/w current meds, take amlodipine in am dos as ordered. Pt aware to stop any NSAIDS, OTC herbals or MVI on 9/14/21. Verbalized understanding. BW done, pending results. DVT prophylaxis as per primary team. MC pending, form provided. Aware to f/u on covid testing prior to sx as per pst protocol and will arrange w/ surgeons office. JAMMIE precautions/allergy notified to OR booking via fax. Patient scheduled for left inguinal hernia repair with mesh on 1/30/24. Preop instructions given both verbal and written,  instructed to be NPO the night before and the morning of surgery. Provided with chlorhexidine 4% solution to wash for 3 days including the morning of surgery, bottle provided .Instructed to avoid aspirin and over the counter medications including vitamins and herbal medications one week before surgery. Patient verbalized understanding. Escort required  Stop bang score is 2 , patient low risk for JAMMIE.    Patient seen by her PCP on 1/23/24 for med optimization, EKG done. CBC, BMP, PTT collected here today, results pending.  Medical optimization pending, report to be obtained.

## 2024-01-24 NOTE — H&P PST ADULT - MS GEN HX ROS MEA POS PC
knees/ hips/ back pain w/ oa and current condition/arthritis/joint pain/stiffness/back pain/leg pain L/leg pain R arthritis/joint pain/stiffness/back pain/leg pain L/leg pain R

## 2024-01-24 NOTE — H&P PST ADULT - ASSESSMENT
DX: spinal stenosis lumbosacral region/ radiculopathy lumbar region/ spondylolisthesis lumbar region and evaluated for a scheduled L2-S1 laminectomy and L4-5 fusion w/ Dr Strauss on 9/21/21.  62 y/o F with PMH of HTN ,HLD, Stroke, TIA, Depression, Anxiety, Bipolar Disorder, Anemia, GERD, spinal stenosis lumbosacral region s/p laminectomy, gastric bypass presents to PST for presurgical evaluation. Patient diagnosed with left inguinal hernia and is now scheduled for left inguinal hernia repair with mesh on 1/30/24. Patient seen by her PCP on 1/23/24 for med optimization, EKG done.

## 2024-01-24 NOTE — H&P PST ADULT - MUSCULOSKELETAL COMMENTS
spinal stenosis lumbosacral region/ radiculopathy lumbar region/ spondylolisthesis lumbar region DX: spinal stenosis lumbosacral region/ radiculopathy lumbar region/ spondylolisthesis lumbar region uses a walker with walking

## 2024-01-24 NOTE — H&P PST ADULT - HISTORY OF PRESENT ILLNESS
60 y/o F presents to PST w/ a preop dx of spinal stenosis lumbosacral region/ radiculopathy lumbar region/ spondylolisthesis lumbar region and to be evaluated for a scheduled L2-S1 laminectomy and L4-5 fusion. pt states back pain for about 6 years, worsening. undergo PT, po meds, cortisone shoots w/o relief. Pt re evaluated and recommended surgical intervention at this time w/ Dr Strauss.  62 y/o F with PMH of HTN ,HLD, Stroke, TIA, Depression, Anxiety, Bipolar Disorder, Anemia, GERD, spinal stenosis lumbosacral region s/p laminectomy, gastric bypass presents to PST for presurgical evaluation. Patient diagnosed with left inguinal hernia and is now scheduled for left inguinal hernia repair with mesh on 1/30/24. Patient seen by her PCP on 1/23/24 for med optimization, EKG done.

## 2024-01-25 PROCEDURE — 80048 BASIC METABOLIC PNL TOTAL CA: CPT

## 2024-01-25 PROCEDURE — G0463: CPT

## 2024-01-25 PROCEDURE — 85730 THROMBOPLASTIN TIME PARTIAL: CPT

## 2024-01-25 PROCEDURE — 85027 COMPLETE CBC AUTOMATED: CPT

## 2024-01-25 PROCEDURE — 36415 COLL VENOUS BLD VENIPUNCTURE: CPT

## 2024-01-29 ENCOUNTER — TRANSCRIPTION ENCOUNTER (OUTPATIENT)
Age: 64
End: 2024-01-29

## 2024-01-30 ENCOUNTER — TRANSCRIPTION ENCOUNTER (OUTPATIENT)
Age: 64
End: 2024-01-30

## 2024-01-30 ENCOUNTER — APPOINTMENT (OUTPATIENT)
Dept: SURGERY | Facility: HOSPITAL | Age: 64
End: 2024-01-30

## 2024-01-30 ENCOUNTER — OUTPATIENT (OUTPATIENT)
Dept: OUTPATIENT SERVICES | Facility: HOSPITAL | Age: 64
LOS: 1 days | End: 2024-01-30
Payer: MEDICARE

## 2024-01-30 VITALS
RESPIRATION RATE: 16 BRPM | TEMPERATURE: 98 F | DIASTOLIC BLOOD PRESSURE: 63 MMHG | OXYGEN SATURATION: 99 % | SYSTOLIC BLOOD PRESSURE: 115 MMHG | HEART RATE: 82 BPM

## 2024-01-30 VITALS
HEART RATE: 72 BPM | RESPIRATION RATE: 16 BRPM | HEIGHT: 66 IN | WEIGHT: 177.03 LBS | OXYGEN SATURATION: 98 % | TEMPERATURE: 98 F | DIASTOLIC BLOOD PRESSURE: 118 MMHG | SYSTOLIC BLOOD PRESSURE: 190 MMHG

## 2024-01-30 DIAGNOSIS — Z98.890 OTHER SPECIFIED POSTPROCEDURAL STATES: Chronic | ICD-10-CM

## 2024-01-30 DIAGNOSIS — Z01.818 ENCOUNTER FOR OTHER PREPROCEDURAL EXAMINATION: ICD-10-CM

## 2024-01-30 DIAGNOSIS — Z98.84 BARIATRIC SURGERY STATUS: Chronic | ICD-10-CM

## 2024-01-30 DIAGNOSIS — M21.611 BUNION OF RIGHT FOOT: Chronic | ICD-10-CM

## 2024-01-30 DIAGNOSIS — Z98.89 OTHER SPECIFIED POSTPROCEDURAL STATES: Chronic | ICD-10-CM

## 2024-01-30 DIAGNOSIS — Z90.711 ACQUIRED ABSENCE OF UTERUS WITH REMAINING CERVICAL STUMP: Chronic | ICD-10-CM

## 2024-01-30 DIAGNOSIS — Z96.642 PRESENCE OF LEFT ARTIFICIAL HIP JOINT: Chronic | ICD-10-CM

## 2024-01-30 DIAGNOSIS — Z98.51 TUBAL LIGATION STATUS: Chronic | ICD-10-CM

## 2024-01-30 DIAGNOSIS — Z90.710 ACQUIRED ABSENCE OF BOTH CERVIX AND UTERUS: Chronic | ICD-10-CM

## 2024-01-30 DIAGNOSIS — K40.90 UNILATERAL INGUINAL HERNIA, WITHOUT OBSTRUCTION OR GANGRENE, NOT SPECIFIED AS RECURRENT: ICD-10-CM

## 2024-01-30 DIAGNOSIS — Z96.60 PRESENCE OF UNSPECIFIED ORTHOPEDIC JOINT IMPLANT: Chronic | ICD-10-CM

## 2024-01-30 DIAGNOSIS — T84.093A OTHER MECHANICAL COMPLICATION OF INTERNAL LEFT KNEE PROSTHESIS, INITIAL ENCOUNTER: Chronic | ICD-10-CM

## 2024-01-30 PROCEDURE — 49505 PRP I/HERN INIT REDUC >5 YR: CPT | Mod: AS,LT

## 2024-01-30 PROCEDURE — C1781: CPT

## 2024-01-30 PROCEDURE — 49505 PRP I/HERN INIT REDUC >5 YR: CPT | Mod: LT

## 2024-01-30 PROCEDURE — 49505 PRP I/HERN INIT REDUC >5 YR: CPT

## 2024-01-30 DEVICE — MESH HERNIA MARLEX 3 X 6": Type: IMPLANTABLE DEVICE | Site: LEFT | Status: FUNCTIONAL

## 2024-01-30 DEVICE — MESH HERNIA PERFIX PLUG LARGE 1.6 X 1.90": Type: IMPLANTABLE DEVICE | Site: LEFT | Status: FUNCTIONAL

## 2024-01-30 DEVICE — MESH HERNIA PERFIX PLUG EXTRA LARGE 1.6 X 2": Type: IMPLANTABLE DEVICE | Site: LEFT | Status: FUNCTIONAL

## 2024-01-30 DEVICE — MESH HERNIA PERFIX PLUG MEDIUM 1.3 X 1.55": Type: IMPLANTABLE DEVICE | Site: LEFT | Status: FUNCTIONAL

## 2024-01-30 RX ORDER — OXYCODONE HYDROCHLORIDE 5 MG/1
5 TABLET ORAL EVERY 6 HOURS
Refills: 0 | Status: DISCONTINUED | OUTPATIENT
Start: 2024-01-30 | End: 2024-01-30

## 2024-01-30 RX ORDER — KETOROLAC TROMETHAMINE 30 MG/ML
15 SYRINGE (ML) INJECTION EVERY 6 HOURS
Refills: 0 | Status: DISCONTINUED | OUTPATIENT
Start: 2024-01-30 | End: 2024-01-30

## 2024-01-30 RX ORDER — HYDRALAZINE HCL 50 MG
10 TABLET ORAL ONCE
Refills: 0 | Status: COMPLETED | OUTPATIENT
Start: 2024-01-30 | End: 2024-01-30

## 2024-01-30 RX ORDER — FENTANYL CITRATE 50 UG/ML
50 INJECTION INTRAVENOUS ONCE
Refills: 0 | Status: DISCONTINUED | OUTPATIENT
Start: 2024-01-30 | End: 2024-01-30

## 2024-01-30 RX ORDER — ACETAMINOPHEN 500 MG
650 TABLET ORAL EVERY 6 HOURS
Refills: 0 | Status: DISCONTINUED | OUTPATIENT
Start: 2024-01-30 | End: 2024-02-13

## 2024-01-30 RX ORDER — FENTANYL CITRATE 50 UG/ML
25 INJECTION INTRAVENOUS
Refills: 0 | Status: DISCONTINUED | OUTPATIENT
Start: 2024-01-30 | End: 2024-01-30

## 2024-01-30 RX ORDER — BACLOFEN 100 %
1 POWDER (GRAM) MISCELLANEOUS
Refills: 0 | DISCHARGE

## 2024-01-30 RX ORDER — GABAPENTIN 400 MG/1
1 CAPSULE ORAL
Refills: 0 | DISCHARGE

## 2024-01-30 RX ORDER — OXYCODONE HYDROCHLORIDE 5 MG/1
1 TABLET ORAL
Qty: 10 | Refills: 0
Start: 2024-01-30

## 2024-01-30 RX ORDER — LISINOPRIL 2.5 MG/1
1 TABLET ORAL
Refills: 0 | DISCHARGE

## 2024-01-30 RX ORDER — LABETALOL HCL 100 MG
10 TABLET ORAL
Refills: 0 | Status: DISCONTINUED | OUTPATIENT
Start: 2024-01-30 | End: 2024-02-13

## 2024-01-30 RX ADMIN — OXYCODONE HYDROCHLORIDE 5 MILLIGRAM(S): 5 TABLET ORAL at 17:15

## 2024-01-30 RX ADMIN — Medication 10 MILLIGRAM(S): at 12:14

## 2024-01-30 RX ADMIN — Medication 10 MILLIGRAM(S): at 11:05

## 2024-01-30 RX ADMIN — Medication 15 MILLIGRAM(S): at 15:14

## 2024-01-30 RX ADMIN — OXYCODONE HYDROCHLORIDE 5 MILLIGRAM(S): 5 TABLET ORAL at 16:17

## 2024-01-30 RX ADMIN — Medication 15 MILLIGRAM(S): at 15:29

## 2024-01-30 RX ADMIN — SODIUM CHLORIDE 3 MILLILITER(S): 9 INJECTION INTRAMUSCULAR; INTRAVENOUS; SUBCUTANEOUS at 16:24

## 2024-01-30 RX ADMIN — Medication 10 MILLIGRAM(S): at 11:32

## 2024-01-30 NOTE — BRIEF OPERATIVE NOTE - NSICDXBRIEFPROCEDURE_GEN_ALL_CORE_FT
PROCEDURES:  Repair, hernia, inguinal, open, using mesh, adult 30-Jan-2024 14:09:43 LEFT Leti Tsang

## 2024-01-30 NOTE — ASU PATIENT PROFILE, ADULT - FALL HARM RISK - HARM RISK INTERVENTIONS

## 2024-01-30 NOTE — ASU DISCHARGE PLAN (ADULT/PEDIATRIC) - NS MD DC FALL RISK RISK
For information on Fall & Injury Prevention, visit: [email or web address] OR  [email or web address] OR  https://www.cdc.gov/steadi/patient.html

## 2024-01-30 NOTE — ASU PREOP CHECKLIST - TAMPON REMOVED
Problem: Adult Inpatient Plan of Care  Goal: Plan of Care Review  Outcome: Outcome(s) achieved Date Met: 07/19/19  Patient tolerated Taxol infusion with no complications. VS stable throughout infusion. Labs reviewed. Port flushed, heparin locked and de-accessed prior to discharge. AVS declined. Discharged home.       n/a

## 2024-01-30 NOTE — ASU DISCHARGE PLAN (ADULT/PEDIATRIC) - CARE PROVIDER_API CALL
Carlitos Crenshaw.  Surgery  [address]  [place] [address]  Phone: [phone]  Fax: [phone]  Follow Up Time:

## 2024-05-02 NOTE — PATIENT PROFILE ADULT - SURGICAL SITE DRAIN
-Change your dressing as instructed and immediately if it becomes soiled, soaked, or falls off.    -Should you experience any significant changes in your wound(s), such as infection (redness, swelling, localized heat, increased pain, fever > 101 F, chills) or have any questions regarding your home care instructions, please contact the wound center at [phone]. If after hours, contact your primary care physician or go to the hospital emergency room.     yes

## 2024-10-22 NOTE — H&P PST ADULT - ENERGY EXPENDITURE (METS)
Patient Education     Well Child Exam 7 to 8 Years   About this topic   Your child's well child exam is a visit with the doctor to check your child's health. The doctor measures your child's weight and height, and may measure your child's body mass index (BMI). The doctor plots these numbers on a growth curve. The growth curve gives a picture of your child's growth at each visit. The doctor may listen to your child's heart, lungs, and belly. Your doctor will do a full exam of your child from the head to the toes.  Your child may also need shots or blood tests during this visit.  General   Growth and Development   Your doctor will ask you how your child is developing. The doctor will focus on the skills that most children your child's age are expected to do. During this time of your child's life, here are some things you can expect.  Movement - Your child may:  Be able to write and draw well  Kick a ball while running  Be independent in bathing or showering  Enjoy team or organized sports  Have better hand-eye coordination  Hearing, seeing, and talking - Your child will likely:  Have a longer attention span  Be able to tell time  Enjoy reading  Understand concepts of counting, same and different, and time  Be able to talk almost at the level of an adult  Feelings and behavior - Your child will likely:  Want to do a very good job and be upset if making mistakes  Take direction well  Understand the difference between right and wrong  May have low self confidence  Need encouragement and positive feedback  Want to fit in with peers  Feeding - Your child needs:  3 servings of lowfat or fat-free milk each day  5 servings of fruits and vegetables each day  To start each day with a healthy breakfast  To be given a variety of healthy foods. Many children like to help cook and make food fun.  To limit fruit juice, soda, chips, candy, and foods high in fats  To eat meals as a part of the family. Turn the TV and cell phone off  while eating. Talk about your day, rather than focusing on what your child is eating.  Sleep - Your child:  Is likely sleeping about 10 hours in a row at night.  Try to have the same routine before bedtime. Read to your child each night before bed.  Have your child brush teeth before going to bed as well.  Keep electronic devices like TV's, phones, and tablets out of bedrooms overnight.  Shots or vaccines - It is important for your child to get a flu vaccine each year. Your child may also need a COVID-19 vaccine.  Help for Parents   Play with your child.  Encourage your child to spend at least 1 hour each day being physically active.  Offer your child a variety of activities to take part in. Include music, sports, arts and crafts, and other things your child is interested in. Take care not to over schedule your child. 1 to 2 activities a week outside of school is often a good number for your child.  Make sure your child wears a helmet when using anything with wheels like skates, skateboard, bike, etc.  Encourage time spent playing with friends. Provide a safe area for play.  Read to your child. Have your child read to you.  Here are some things you can do to help keep your child safe and healthy.  Have your child brush teeth 2 to 3 times each day. Children this age are able to floss their teeth as well. Your child should also see a dentist 1 to 2 times each year for a cleaning and checkup.  Put sunscreen with a SPF30 or higher on your child at least 15 to 30 minutes before going outside. Put more sunscreen on after about 2 hours.  Talk to your child about the dangers of smoking, drinking alcohol, and using drugs. Do not allow anyone to smoke in your home or around your child.  Your child needs to ride in a booster seat until 4 feet 9 inches (145 cm) tall. After that, make sure your child uses a seat belt when riding in the car. Your child should ride in the back seat until at least 13 years old.  Take extra care  around water. Consider teaching your child to swim.  Never leave your child alone. Do not leave your child in the car or at home alone, even for a few minutes.  Protect your child from gun injuries. If you have a gun, use a trigger lock. Keep the gun locked up and the bullets kept in a separate place.  Limit screen time for children to 1 to 2 hours per day. This means TV, phones, computers, or video games.  Parents need to think about:  Teaching your child what to do in case of an emergency  Monitoring your child’s computer use, especially if on the Internet  Talking to your child about strangers, unwanted touch, and keeping private parts safe  How to talk to your child about puberty  Having your child help with some family chores to encourage responsibility within the family  The next well child visit will most likely be when your child is 8 to 9 years old. At this visit your doctor may:  Do a full check up on your child  Talk about limiting screen time for your child, how well your child is eating, and how to promote physical activity  Ask how your child is doing at school and how your child gets along with other children  Talk about signs of puberty  When do I need to call the doctor?   Fever of 100.4°F (38°C) or higher  Has trouble eating or sleeping  Has trouble in school  You are worried about your child's development  Last Reviewed Date   2021-11-04  Consumer Information Use and Disclaimer   This generalized information is a limited summary of diagnosis, treatment, and/or medication information. It is not meant to be comprehensive and should be used as a tool to help the user understand and/or assess potential diagnostic and treatment options. It does NOT include all information about conditions, treatments, medications, side effects, or risks that may apply to a specific patient. It is not intended to be medical advice or a substitute for the medical advice, diagnosis, or treatment of a health care provider  based on the health care provider's examination and assessment of a patient’s specific and unique circumstances. Patients must speak with a health care provider for complete information about their health, medical questions, and treatment options, including any risks or benefits regarding use of medications. This information does not endorse any treatments or medications as safe, effective, or approved for treating a specific patient. [employer] and its affiliates disclaim any warranty or liability relating to this information or the use thereof. The use of this information is governed by the Terms of Use, available at https://www.upurskiller.com/en/know/clinical-effectiveness-terms   Copyright   Copyright © 2024 [employer] and its affiliates and/or licensors. All rights reserved.     5

## 2024-12-23 NOTE — ASU PATIENT PROFILE, ADULT - NS PRO AD PATIENT TYPE
Called patient to see how she is doing after surgery. Patient reports she is doing well overall and denies any incisional issues or fevers. Patient is able to ambulate around the house and complete ADLs. Educated the patient about the importance of preventing blood clots and provided measures how to prevent them.     Patient has moved her bowels since the surgery. Encouraged patient to take an over the counter stool softener, if she is taking narcotic pain medication. Encouraged fiber intake and fluids.    Reviewed incision care with the patient. Advised that after three days she may take a shower and gently wash the surgical site with soap and water. Use clean wash cloth, towels, and clothing. Do not submerge in water until cleared by the surgeon. Do not apply any creams, ointments, or lotions to the site.  Patient is aware to call the office if any redness, swelling, drainage, dehiscence of incision, or fever >100 F occurs.    Patient is aware to call the office if any concerns or questions may arise. Reminded patient of her upcoming appointments with the date/time/location. Patient was appreciative for the call.     Health Care Proxy (HCP)

## 2025-05-03 ENCOUNTER — NON-APPOINTMENT (OUTPATIENT)
Age: 65
End: 2025-05-03

## 2025-05-05 ENCOUNTER — APPOINTMENT (OUTPATIENT)
Dept: UROLOGY | Facility: CLINIC | Age: 65
End: 2025-05-05

## (undated) DEVICE — SOL IRR POUR NS 0.9% 1500ML

## (undated) DEVICE — SPONGE DISSECTOR PEANUT

## (undated) DEVICE — SUT POLYSORB 4-0 27" P-12 UNDYED

## (undated) DEVICE — SUT POLYSORB 3-0 30" V-20 UNDYED

## (undated) DEVICE — SUT POLYSORB 2-0 30" V-20 UNDYED

## (undated) DEVICE — DRAPE LIGHT HANDLE COVER (BLUE)

## (undated) DEVICE — NDL HYPO SAFE 25G X 1.5" (ORANGE)

## (undated) DEVICE — SUT SOFSILK 2-0 30" V-20

## (undated) DEVICE — DRSG TEGADERM 4X4.75"

## (undated) DEVICE — WARMING BLANKET UPPER ADULT

## (undated) DEVICE — PACK MINOR NO DRAPE

## (undated) DEVICE — VENODYNE/SCD SLEEVE CALF MEDIUM

## (undated) DEVICE — DRSG MASTISOL

## (undated) DEVICE — SUT SURGIPRO 2-0 30" GS-22

## (undated) DEVICE — DRAIN PENROSE 5/8" X 18" LATEX

## (undated) DEVICE — ELCTR GROUNDING PAD ADULT COVIDIEN

## (undated) DEVICE — GLV 7 PROTEXIS (WHITE)

## (undated) DEVICE — DRSG CURITY GAUZE SPONGE 4 X 4" 12-PLY

## (undated) DEVICE — SUT POLYSORB 2-0 18" TIES UNDYED

## (undated) DEVICE — DRAPE LAPAROTOMY W POUCHES

## (undated) DEVICE — FOR-ESU VALLEYLAB T7E14996DX: Type: DURABLE MEDICAL EQUIPMENT

## (undated) DEVICE — PREP CHLORAPREP HI-LITE ORANGE 26ML